# Patient Record
Sex: FEMALE | Race: WHITE | Employment: OTHER | URBAN - METROPOLITAN AREA
[De-identification: names, ages, dates, MRNs, and addresses within clinical notes are randomized per-mention and may not be internally consistent; named-entity substitution may affect disease eponyms.]

---

## 2017-06-20 PROBLEM — R11.2 NAUSEA AND VOMITING: Status: ACTIVE | Noted: 2017-06-20

## 2018-06-21 ENCOUNTER — HOSPITAL ENCOUNTER (OUTPATIENT)
Dept: ULTRASOUND IMAGING | Age: 58
Discharge: HOME | End: 2018-06-21
Payer: COMMERCIAL

## 2018-06-21 DIAGNOSIS — E04.1 RIGHT THYROID NODULE: ICD-10-CM

## 2018-06-21 PROCEDURE — 76536 US EXAM OF HEAD AND NECK: CPT

## 2019-06-02 ENCOUNTER — HOSPITAL ENCOUNTER (OUTPATIENT)
Dept: ULTRASOUND IMAGING | Age: 59
Discharge: HOME | End: 2019-06-02
Payer: COMMERCIAL

## 2019-06-02 DIAGNOSIS — R60.9 SWELLING: ICD-10-CM

## 2019-06-02 DIAGNOSIS — R52 PAIN: ICD-10-CM

## 2019-06-02 PROCEDURE — 93971 EXTREMITY STUDY: CPT

## 2019-07-22 ENCOUNTER — HOSPITAL ENCOUNTER (OUTPATIENT)
Dept: ULTRASOUND IMAGING | Age: 59
Discharge: HOME | End: 2019-07-22
Payer: COMMERCIAL

## 2019-07-22 DIAGNOSIS — E04.1 THYROID NODULE: ICD-10-CM

## 2019-07-22 PROCEDURE — 76536 US EXAM OF HEAD AND NECK: CPT

## 2020-09-25 ENCOUNTER — HOSPITAL ENCOUNTER (OUTPATIENT)
Dept: GENERAL RADIOLOGY | Age: 60
Discharge: HOME | End: 2020-09-27
Payer: COMMERCIAL

## 2020-09-25 ENCOUNTER — HOSPITAL ENCOUNTER (OUTPATIENT)
Age: 60
Discharge: HOME | End: 2020-09-27
Payer: COMMERCIAL

## 2020-09-25 PROCEDURE — 73630 X-RAY EXAM OF FOOT: CPT

## 2021-04-09 ENCOUNTER — OFFICE VISIT (OUTPATIENT)
Dept: ORTHOPEDIC SURGERY | Age: 61
End: 2021-04-09
Payer: COMMERCIAL

## 2021-04-09 VITALS — BODY MASS INDEX: 40.75 KG/M2 | HEIGHT: 63 IN | WEIGHT: 230 LBS | TEMPERATURE: 98 F

## 2021-04-09 DIAGNOSIS — M25.521 RIGHT ELBOW PAIN: Primary | ICD-10-CM

## 2021-04-09 DIAGNOSIS — M77.11 LATERAL EPICONDYLITIS, RIGHT ELBOW: ICD-10-CM

## 2021-04-09 PROCEDURE — G8417 CALC BMI ABV UP PARAM F/U: HCPCS | Performed by: ORTHOPAEDIC SURGERY

## 2021-04-09 PROCEDURE — 3017F COLORECTAL CA SCREEN DOC REV: CPT | Performed by: ORTHOPAEDIC SURGERY

## 2021-04-09 PROCEDURE — 99203 OFFICE O/P NEW LOW 30 MIN: CPT | Performed by: ORTHOPAEDIC SURGERY

## 2021-04-09 PROCEDURE — 1036F TOBACCO NON-USER: CPT | Performed by: ORTHOPAEDIC SURGERY

## 2021-04-09 PROCEDURE — G8427 DOCREV CUR MEDS BY ELIG CLIN: HCPCS | Performed by: ORTHOPAEDIC SURGERY

## 2021-04-09 RX ORDER — TRAZODONE HYDROCHLORIDE 100 MG/1
TABLET ORAL
COMMUNITY
Start: 2021-02-25

## 2021-04-09 RX ORDER — BUSPIRONE HYDROCHLORIDE 30 MG/1
TABLET ORAL
COMMUNITY
Start: 2021-04-01

## 2021-04-09 NOTE — PROGRESS NOTES
Shawnee Devine is a 61 y.o. female, who presents   Chief Complaint   Patient presents with    Elbow Pain     new patient right elbow pain present for about 1 year. No hx of injury. Patient brought films. HPI[de-identified] Right elbow pain is been present reportedly for about a year or more. This is in the lateral aspect of the right elbow with radiation into the forearm and upper arm. This does impair activities some. The patient also has a problem with her back and has spinal stimulator in place. She uses a cane because when the stimulators on she apparently cannot feel her feet well. She went to [email or web address] imaging 3/24/2021 and has 2 views of the right elbow which were reviewed. Allergies; medications; past medical, surgical, family, and social history; and problem list have been reviewed today and updated as indicated in this encounter - see below following Ortho specifics. Musculoskeletal: Skin condition gross neurovascular function good in right upper extremity. Elbow motion is good on the right side with some discomfort. Shoulder and wrist motion and hand motion are good with no pain or instability. There is tenderness to palpation of the lateral elbow most with some in the extensor muscle wad and slightly above this as well. Rotation of the forearm is full with no pain. There is pain with resisted muscle testing involving dorsiflexion of the wrist, radial deviation of the wrist and supination of the forearm. Radiologic Studies: Imaging 3/24/2021 shows a normal right elbow with regards to bony anatomy. ASSESSMENT:  Flavio Mora was seen today for elbow pain.     Diagnoses and all orders for this visit:    Right elbow pain  -     External Referral To Physical Therapy    Lateral epicondylitis, right elbow     Treatment alternatives were reviewed including medical and physical therapies, injections, and surgical options, expected risks benefits and likely outcome of each were discussed in detail, questions asked and answered and understood. We discussed the symptoms as well as physical findings and imaging results. This is typical of tennis elbow or extensor tendinitis and lateral epicondylitis. PLAN: We will schedule physical therapy to try to work this problem out gradually with hopes of a durable result and continued home exercise to prevent recurrence. At some point in her progress, if it seems that an injection would be helpful we will discuss that as well. We will follow-up in about 4 weeks.         Patient Active Problem List   Diagnosis    Arrhythmia    Occipital neuralgia    Liver enzyme elevation    Rheumatoid arthritis ([address]    Obesity    History of ITP    Dyslipidemia    Murmur, heart    Idiopathic thrombocytopenic purpura (HCC)    Hx of gastric bypass    Anemia    Nausea and vomiting       Past Medical History:   Diagnosis Date    Arrhythmia     PVCs on holter    Auto immune neutropenia (HCC)     Hx of blood clots     left thigh many yrs ago    Hyperlipidemia     ITP (idiopathic thrombocytopenic purpura)     Liver disease     history of elevated liver enzymes 2013    Liver enzyme elevation     due to long standing history of migraine drug useage    Migraine     Migraine     Occipital neuralgia     occipital ablation 7/22/2013 [facility]    Osteoporosis     PONV (postoperative nausea and vomiting)     with anesthesia    Thyroid disease        Past Surgical History:   Procedure Laterality Date    BACK SURGERY  2010    removal of titanium plate cervical spine    CHOLECYSTECTOMY      COLONOSCOPY  12/28/2011    bradford Morales CATH LAB PROCEDURE  May 2000    Dr. Noah Elliott : Normal coronaries, EF is 66%    ENDOMETRIAL ABLATION      FRACTURE SURGERY Left      femur    GASTRIC BYPASS SURGERY      JOINT REPLACEMENT  2012    total right knee replacement    JOINT REPLACEMENT  2015    total left knee    OTHER SURGICAL HISTORY      cervical Spinal cord stimulator  in [facility]  2013    left greater occipital radiofreqency ablation    OTHER SURGICAL HISTORY Right 04/06/2017    Excision of cyst on ring finger    SPINAL FUSION      spinal fusion of C5 and C6 then hardware was removed    SPLENECTOMY      d/t ITP    WRIST FRACTURE SURGERY      multiple 7 right   1 on left       Current Outpatient Medications   Medication Sig Dispense Refill    busPIRone (BUSPAR) 30 MG tablet       traZODone (DESYREL) 100 MG tablet TAKE 1/2 TO 2 TABLETS BY MOUTH AT BEDTIME AS NEEDED FOR SLEEP      levothyroxine (SYNTHROID) 75 MCG tablet Take 75 mcg by mouth Daily      Teriparatide, Recombinant, (FORTEO SC) Inject 20 mcg into the skin      simvastatin (ZOCOR) 20 MG tablet Take 20 mg by mouth nightly.  duloxetine (CYMBALTA) 60 MG capsule Take 60 mg by mouth daily       Multiple Vitamin (MULTI-VITAMIN PO) Take 1 tablet by mouth daily. No current facility-administered medications for this visit. Allergies   Allergen Reactions    Sulfites Hives, Shortness Of Breath and Itching     flush    Aspirin Other (See Comments)    Ceclor [Cefaclor] Itching and Swelling     Of face/throat    Kiwi Extract     Tape Bernell Colla Tape] Other (See Comments)     SKIN IRRITATION    Topamax Other (See Comments)     JAW AND CHEST PAIN    Other Itching and Rash     blisters  Apples/kiwi  Itching in throat. Toothpaste environmental       Social History     Socioeconomic History    Marital status:      Spouse name: None    Number of children: None    Years of education: None    Highest education level: None   Occupational History    None   Social Needs    Financial resource strain: None    Food insecurity     Worry: None     Inability: None    Transportation needs     Medical: None     Non-medical: None   Tobacco Use    Smoking status: Never Smoker    Smokeless tobacco: Never Used   Substance and Sexual Activity    Alcohol use:  Yes

## 2021-05-07 ENCOUNTER — OFFICE VISIT (OUTPATIENT)
Dept: ORTHOPEDIC SURGERY | Age: 61
End: 2021-05-07
Payer: COMMERCIAL

## 2021-05-07 VITALS — HEIGHT: 63 IN | WEIGHT: 230 LBS | BODY MASS INDEX: 40.75 KG/M2

## 2021-05-07 DIAGNOSIS — M77.11 LATERAL EPICONDYLITIS, RIGHT ELBOW: Primary | ICD-10-CM

## 2021-05-07 PROCEDURE — 99213 OFFICE O/P EST LOW 20 MIN: CPT | Performed by: ORTHOPAEDIC SURGERY

## 2021-05-07 PROCEDURE — G8417 CALC BMI ABV UP PARAM F/U: HCPCS | Performed by: ORTHOPAEDIC SURGERY

## 2021-05-07 PROCEDURE — G8427 DOCREV CUR MEDS BY ELIG CLIN: HCPCS | Performed by: ORTHOPAEDIC SURGERY

## 2021-05-07 PROCEDURE — 20551 NJX 1 TENDON ORIGIN/INSJ: CPT | Performed by: ORTHOPAEDIC SURGERY

## 2021-05-07 PROCEDURE — 1036F TOBACCO NON-USER: CPT | Performed by: ORTHOPAEDIC SURGERY

## 2021-05-07 PROCEDURE — 3017F COLORECTAL CA SCREEN DOC REV: CPT | Performed by: ORTHOPAEDIC SURGERY

## 2021-05-07 NOTE — PROGRESS NOTES
Chief Complaint:   Chief Complaint   Patient presents with    Elbow Pain     Right elbow follow up. Since last visit her elbow pain has improved to a degree with PT.       Mark Anthony Bullock follows up for right lateral epicondylitis. She is doing better overall but still has some discomfort particular if she is doing's little tasks like putting on her electric toothbrush with her thumb. Has been wearing a strap she is been going to physical therapy and her notes were faxed to us for today. Allergies; medications; past medical, surgical, family, and social history; and problem list have been reviewed today and updated as indicated in this encounter seen below. Exam: Skin condition gross neurovascular function good in right upper extremity. Right elbow motion is good. She has full forearm rotation. She has good  and pinch. There is less discomfort with dorsiflexion of the wrist against resistance. There remains tenderness to palpation in the extensor muscles near the epicondyles laterally. Radiographs: None    ASSESSMENT:    Rosa Cutler was seen today for elbow pain. Diagnoses and all orders for this visit:    Lateral epicondylitis, right elbow        PLAN: After further discussion Mrs. Cassandra Cam would like an injection to help decrease her discomfort and help progress her physical therapy and recovery. We discussed the risks and potential benefits of this. injection of the tendon the lateral epicondyle right with Kenalog   (triamcinalone)         10mg and 1/2 cc 1/4 % bupivicainewas discussed with the patient. Discussion included but was not limited to potential risks and benefits. No contraindications to the procedure were noted. Understanding and agreement was indicated. The procedure was accomplished without incident using appropriate aseptic technique. follow up as scheduled    Return in about 4 weeks (around 6/4/2021).        Current Outpatient Medications   Medication Sig Dispense Refill    busPIRone (BUSPAR) 30 MG tablet       traZODone (DESYREL) 100 MG tablet TAKE 1/2 TO 2 TABLETS BY MOUTH AT BEDTIME AS NEEDED FOR SLEEP      levothyroxine (SYNTHROID) 75 MCG tablet Take 75 mcg by mouth Daily      Teriparatide, Recombinant, (FORTEO SC) Inject 20 mcg into the skin      simvastatin (ZOCOR) 20 MG tablet Take 20 mg by mouth nightly.  duloxetine (CYMBALTA) 60 MG capsule Take 60 mg by mouth daily       Multiple Vitamin (MULTI-VITAMIN PO) Take 1 tablet by mouth daily. No current facility-administered medications for this visit.         Patient Active Problem List   Diagnosis    Arrhythmia    Occipital neuralgia    Liver enzyme elevation    Rheumatoid arthritis ([facility]    Obesity    History of ITP    Dyslipidemia    Murmur, heart    Idiopathic thrombocytopenic purpura (HCC)    Hx of gastric bypass    Anemia    Nausea and vomiting       Past Medical History:   Diagnosis Date    Arrhythmia     PVCs on holter    Auto immune neutropenia (HCC)     Hx of blood clots     left thigh many yrs ago    Hyperlipidemia     ITP (idiopathic thrombocytopenic purpura)     Liver disease     history of elevated liver enzymes 2013    Liver enzyme elevation     due to long standing history of migraine drug useage    Migraine     Migraine     Occipital neuralgia     occipital ablation 7/22/2013 [facility]    Osteoporosis     PONV (postoperative nausea and vomiting)     with anesthesia    Thyroid disease        Past Surgical History:   Procedure Laterality Date    BACK SURGERY  2010    removal of titanium plate cervical spine    CHOLECYSTECTOMY      COLONOSCOPY  12/28/2011    bradford Martinez CATH LAB PROCEDURE  May 2000    Dr. Montse Garduno : Normal coronaries, EF is 66%    ENDOMETRIAL ABLATION      FRACTURE SURGERY Left      femur    GASTRIC BYPASS SURGERY      JOINT REPLACEMENT  2012    total right knee replacement    JOINT REPLACEMENT  2015    total left knee    OTHER SURGICAL HISTORY      cervical Spinal cord stimulator  in [place]   Dionne Rogers OTHER SURGICAL HISTORY  2013    left greater occipital radiofreqency ablation    OTHER SURGICAL HISTORY Right 04/06/2017    Excision of cyst on ring finger    SPINAL FUSION      spinal fusion of C5 and C6 then hardware was removed    SPLENECTOMY      d/t ITP    WRIST FRACTURE SURGERY      multiple 7 right   1 on left       Allergies   Allergen Reactions    Sulfites Hives, Shortness Of Breath and Itching     flush    Aspirin Other (See Comments)    Ceclor [Cefaclor] Itching and Swelling     Of face/throat    Kiwi Extract     Tape Aden Footman Tape] Other (See Comments)     SKIN IRRITATION    Topamax Other (See Comments)     JAW AND CHEST PAIN    Other Itching and Rash     blisters  Apples/kiwi  Itching in throat.  Toothpaste environmental       Social History     Socioeconomic History    Marital status:      Spouse name: None    Number of children: None    Years of education: None    Highest education level: None   Occupational History    None   Social Needs    Financial resource strain: None    Food insecurity     Worry: None     Inability: None    Transportation needs     Medical: None     Non-medical: None   Tobacco Use    Smoking status: Never Smoker    Smokeless tobacco: Never Used   Substance and Sexual Activity    Alcohol use: Yes     Comment: socially    Drug use: No    Sexual activity: None   Lifestyle    Physical activity     Days per week: None     Minutes per session: None    Stress: None   Relationships    Social connections     Talks on phone: None     Gets together: None     Attends Scientology service: None     Active member of club or organization: None     Attends meetings of clubs or organizations: None     Relationship status: None    Intimate partner violence     Fear of current or ex partner: None     Emotionally abused: None     Physically abused: None     Forced sexual activity: None Other Topics Concern    None   Social History Narrative    None       Review of Systems  As follows except as previously noted in HPI:  Constitutional: Negative for chills, diaphoresis, fatigue, fever and unexpected weight change. Respiratory: Negative for cough, shortness of breath and wheezing. Cardiovascular: Negative for chest pain and palpitations. Neurological: Negative for dizziness, syncope, cephalgia. GI / : negative  Musculoskeletal: see HPI       Objective:   Physical Exam   Constitutional: Oriented to person, place, and time. and appears well-developed and well-nourished. :   Head: Normocephalic and atraumatic. Eyes: EOM are normal.   Neck: Neck supple. Cardiovascular: Normal rate and regular rhythm. Pulmonary/Chest: Effort normal. No stridor. No respiratory distress, no wheezes. Abdominal:  No abnormal distension. Neurological: Alert and oriented to person, place, and time. Skin: Skin is warm and dry. Psychiatric: Normal mood and affect.  Behavior is normal. Thought content normal.    TISHA Maxwell DO    5/7/21  10:06 AM

## 2021-06-04 ENCOUNTER — OFFICE VISIT (OUTPATIENT)
Dept: ORTHOPEDIC SURGERY | Age: 61
End: 2021-06-04
Payer: COMMERCIAL

## 2021-06-04 VITALS — WEIGHT: 230 LBS | HEIGHT: 63 IN | BODY MASS INDEX: 40.75 KG/M2

## 2021-06-04 DIAGNOSIS — M77.11 LATERAL EPICONDYLITIS, RIGHT ELBOW: Primary | ICD-10-CM

## 2021-06-04 PROCEDURE — G8427 DOCREV CUR MEDS BY ELIG CLIN: HCPCS | Performed by: ORTHOPAEDIC SURGERY

## 2021-06-04 PROCEDURE — 3017F COLORECTAL CA SCREEN DOC REV: CPT | Performed by: ORTHOPAEDIC SURGERY

## 2021-06-04 PROCEDURE — G8417 CALC BMI ABV UP PARAM F/U: HCPCS | Performed by: ORTHOPAEDIC SURGERY

## 2021-06-04 PROCEDURE — 99213 OFFICE O/P EST LOW 20 MIN: CPT | Performed by: ORTHOPAEDIC SURGERY

## 2021-06-04 PROCEDURE — 1036F TOBACCO NON-USER: CPT | Performed by: ORTHOPAEDIC SURGERY

## 2021-06-04 NOTE — PROGRESS NOTES
Chief Complaint:   Chief Complaint   Patient presents with    Elbow Pain     Right elbow follow up. Patient did see good relief after injection at last visit one month ago. Himanshu Valverde follows up for right upper extremity problems. She has lateral epicondylitis. She is finished her physical therapy and is continued to do her exercises. She also had the injection here last visit which did help her. She states overall she is doing quite well. Allergies; medications; past medical, surgical, family, and social history; and problem list have been reviewed today and updated as indicated in this encounter seen below. Exam: Range of motion of the right elbow is good. Right wrist range of motion is good. There is some crepitus with rotation of the forearm to patient said that she is fractured her right distal forearm 7 times. There is no tenderness palpation in the mobile wad and the proximal forearm and no epicondylar area pain at this time. There is no swelling or redness. Radiographs: None    ASSESSMENT:    Soni Duran was seen today for elbow pain. Diagnoses and all orders for this visit:    Lateral epicondylitis, right elbow        PLAN: Emphasis was placed on continuing the exercises shown in physical therapy to maintain benefit and event recurrences. We will follow-up on an as-needed basis. Return if symptoms worsen or fail to improve. Current Outpatient Medications   Medication Sig Dispense Refill    busPIRone (BUSPAR) 30 MG tablet       traZODone (DESYREL) 100 MG tablet TAKE 1/2 TO 2 TABLETS BY MOUTH AT BEDTIME AS NEEDED FOR SLEEP      levothyroxine (SYNTHROID) 75 MCG tablet Take 75 mcg by mouth Daily      Teriparatide, Recombinant, (FORTEO SC) Inject 20 mcg into the skin      simvastatin (ZOCOR) 20 MG tablet Take 20 mg by mouth nightly.       duloxetine (CYMBALTA) 60 MG capsule Take 60 mg by mouth daily       Multiple Vitamin (MULTI-VITAMIN PO) Take 1 tablet by mouth daily. No current facility-administered medications for this visit.        Patient Active Problem List   Diagnosis    Arrhythmia    Occipital neuralgia    Liver enzyme elevation    [address]oid arthritis ([facility] [facility]    Obesity    History of ITP    Dyslipidemia    Murmur, heart    Idiopathic thrombocytopenic purpura (HCC)    Hx of gastric bypass    Anemia    Nausea and vomiting       Past Medical History:   Diagnosis Date    Arrhythmia     PVCs on holter    Auto immune neutropenia (HCC)     Hx of blood clots     left thigh many yrs ago    Hyperlipidemia     ITP (idiopathic thrombocytopenic purpura)     Liver disease     history of elevated liver enzymes 2013    Liver enzyme elevation     due to long standing history of migraine drug useage    Migraine     Migraine     Occipital neuralgia     occipital ablation 7/22/2013 [facility]    Osteoporosis     PONV (postoperative nausea and vomiting)     with anesthesia    Thyroid disease        Past Surgical History:   Procedure Laterality Date    BACK SURGERY  2010    removal of titanium plate cervical spine    CHOLECYSTECTOMY      COLONOSCOPY  12/28/2011    neg   [employer] CATH LAB PROCEDURE  May 2000    Dr. Osman De Jesus : Normal coronaries, EF is 66%    ENDOMETRIAL ABLATION      FRACTURE SURGERY Left      femur    GASTRIC BYPASS SURGERY      JOINT REPLACEMENT  2012    total right knee replacement    JOINT REPLACEMENT  2015    total left knee    OTHER SURGICAL HISTORY      cervical Spinal cord stimulator  in [address]  2013    left greater occipital radiofreqency ablation    OTHER SURGICAL HISTORY Right 04/06/2017    Excision of cyst on ring finger    SPINAL FUSION      spinal fusion of C5 and C6 then hardware was removed    SPLENECTOMY      d/t ITP    WRIST FRACTURE SURGERY      multiple 7 right   1 on left       Allergies   Allergen Reactions    Sulfites Hives, Shortness Of Breath and Itching flush    Aspirin Other (See Comments)    Ceclor [Cefaclor] Itching and Swelling     Of face/throat    Kiwi Extract     Tape Lajune Gitelman Tape] Other (See Comments)     SKIN IRRITATION    Topamax Other (See Comments)     JAW AND CHEST PAIN    Other Itching and Rash     blisters  Apples/kiwi  Itching in throat. Toothpaste environmental       Social History     Socioeconomic History    Marital status:      Spouse name: None    Number of children: None    Years of education: None    Highest education level: None   Occupational History    None   Tobacco Use    Smoking status: Never Smoker    Smokeless tobacco: Never Used   Substance and Sexual Activity    Alcohol use: Yes     Comment: socially    Drug use: No    Sexual activity: None   Other Topics Concern    None   Social History Narrative    None     Social Determinants of Health     Financial Resource Strain:     Difficulty of Paying Living Expenses:    Food Insecurity:     Worried About Running Out of Food in the Last Year:     Ran Out of Food in the Last Year:    Transportation Needs:     Lack of Transportation (Medical):  Lack of Transportation (Non-Medical):    Physical Activity:     Days of Exercise per Week:     Minutes of Exercise per Session:    Stress:     Feeling of Stress :    Social Connections:     Frequency of Communication with Friends and Family:     Frequency of Social Gatherings with Friends and Family:     Attends Bahai Services:     Active Member of Clubs or Organizations:     Attends Club or Organization Meetings:     Marital Status:    Intimate Partner Violence:     Fear of Current or Ex-Partner:     Emotionally Abused:     Physically Abused:     Sexually Abused:        Review of Systems  As follows except as previously noted in HPI:  Constitutional: Negative for chills, diaphoresis, fatigue, fever and unexpected weight change. Respiratory: Negative for cough, shortness of breath and wheezing. Cardiovascular: Negative for chest pain and palpitations. Neurological: Negative for dizziness, syncope, cephalgia. GI / : negative  Musculoskeletal: see HPI       Objective:   Physical Exam   Constitutional: Oriented to person, place, and time. and appears well-developed and well-nourished. :   Head: Normocephalic and atraumatic. Eyes: EOM are normal.   Neck: Neck supple. Cardiovascular: Normal rate and regular rhythm. Pulmonary/Chest: Effort normal. No stridor. No respiratory distress, no wheezes. Abdominal:  No abnormal distension. Neurological: Alert and oriented to person, place, and time. Skin: Skin is warm and dry. Psychiatric: Normal mood and affect.  Behavior is normal. Thought content normal.    TISHA Gay, DO    6/4/21  8:30 AM

## 2021-11-12 ENCOUNTER — OFFICE VISIT (OUTPATIENT)
Dept: ORTHOPEDIC SURGERY | Age: 61
End: 2021-11-12
Payer: COMMERCIAL

## 2021-11-12 VITALS — WEIGHT: 230 LBS | BODY MASS INDEX: 40.75 KG/M2 | TEMPERATURE: 98 F | HEIGHT: 63 IN

## 2021-11-12 DIAGNOSIS — G89.29 CHRONIC LEFT SHOULDER PAIN: ICD-10-CM

## 2021-11-12 DIAGNOSIS — S46.912A STRAIN OF LEFT SHOULDER, INITIAL ENCOUNTER: Primary | ICD-10-CM

## 2021-11-12 DIAGNOSIS — M25.512 CHRONIC LEFT SHOULDER PAIN: ICD-10-CM

## 2021-11-12 PROCEDURE — G8484 FLU IMMUNIZE NO ADMIN: HCPCS | Performed by: ORTHOPAEDIC SURGERY

## 2021-11-12 PROCEDURE — G8417 CALC BMI ABV UP PARAM F/U: HCPCS | Performed by: ORTHOPAEDIC SURGERY

## 2021-11-12 PROCEDURE — 3017F COLORECTAL CA SCREEN DOC REV: CPT | Performed by: ORTHOPAEDIC SURGERY

## 2021-11-12 PROCEDURE — 1036F TOBACCO NON-USER: CPT | Performed by: ORTHOPAEDIC SURGERY

## 2021-11-12 PROCEDURE — 99213 OFFICE O/P EST LOW 20 MIN: CPT | Performed by: ORTHOPAEDIC SURGERY

## 2021-11-12 PROCEDURE — G8427 DOCREV CUR MEDS BY ELIG CLIN: HCPCS | Performed by: ORTHOPAEDIC SURGERY

## 2021-11-12 RX ORDER — DULOXETIN HYDROCHLORIDE 30 MG/1
CAPSULE, DELAYED RELEASE ORAL
COMMUNITY
Start: 2021-11-01

## 2021-11-12 RX ORDER — BUPROPION HYDROCHLORIDE 100 MG/1
100 TABLET ORAL 2 TIMES DAILY
COMMUNITY

## 2021-11-12 NOTE — PROGRESS NOTES
Juan Haskins is a 64 y.o. female, who presents   Chief Complaint   Patient presents with    Shoulder Pain     est patient new problem. Left shoulder injury in July. Picked up a heavy box and \"felt something tear\"       HPI[de-identified] Left shoulder pain onset was about June 2021. This is Sammie Gann was lifting boxes apparently clearing out her father's house and experienced pain in the front the left shoulder which has persisted with radiation about the area. He doesn't describe sherrell weakness but has pain with lifting particularly with her arms out and with some rotational movements as well. She has no feelings of instability. Treatment has been with topical treatment as well as Tylenol. This has not been real helpful for her. Allergies; medications; past medical, surgical, family, and social history; and problem list have been reviewed today and updated as indicated in this encounter - see below following Ortho specifics. Musculoskeletal: Skin condition gross neurovascular function is good in left upper extremity. Elbow wrist and hand motion are good without instability or pain. The left shoulder has some tenderness in general around the acromioclavicular arch area. There is no suggestion of long head biceps tendon rupture. On muscle testing she seems to have no obvious rotator cuff disruption. The range of motion overall is very good with some crepitus in the subacromial area. Radiologic Studies: Imaging of the left shoulder shows normal bony anatomy with good location of the humeral head and the glenoid and good subacromial space. The [facility] joint looks good as well. There are no other bony abnormalities noted. She does have evidence of the leads for her spinal stimulator. ASSESSMENT:  Daisha Parrish was seen today for shoulder pain. Diagnoses and all orders for this visit:    Strain of left shoulder, initial encounter    Chronic left shoulder pain  -     XR SHOULDER LEFT (MIN 2 VIEWS);  Future  - External Referral To Physical Therapy     Treatment alternatives were reviewed including medical and physical therapies, injections, and surgical options, expected risks benefits and likely outcome of each were discussed in detail, questions asked and answered and understood. We discussed symptoms as well as physical findings and imaging results. This suggests muscle strain in the left shoulder area. There may be some impingement with no obvious bony contributors. We discussed treatment options as well and we'll start out with conservative treatment. PLAN: We'll schedule physical therapy and follow-up in about 4 weeks.         Patient Active Problem List   Diagnosis    Arrhythmia    Occipital neuralgia    Liver enzyme elevation    Rheumatoid arthritis ([facility] [address]    Obesity    History of ITP    Dyslipidemia    Murmur, heart    Idiopathic thrombocytopenic purpura (HCC)    Hx of gastric bypass    Anemia    Nausea and vomiting       Past Medical History:   Diagnosis Date    Arrhythmia     PVCs on holter    Auto immune neutropenia (HCC)     Hx of blood clots     left thigh many yrs ago    Hyperlipidemia     ITP (idiopathic thrombocytopenic purpura)     Liver disease     history of elevated liver enzymes 2013    Liver enzyme elevation     due to long standing history of migraine drug useage    Migraine     Migraine     Occipital neuralgia     occipital ablation 7/22/2013 [facility]    Osteoporosis     PONV (postoperative nausea and vomiting)     with anesthesia    Thyroid disease        Past Surgical History:   Procedure Laterality Date    BACK SURGERY  2010    removal of titanium plate cervical spine    CHOLECYSTECTOMY      COLONOSCOPY  12/28/2011    [facility] CATH LAB PROCEDURE  May 2000    Dr. Javed Marcial : Normal coronaries, EF is 66%    ENDOMETRIAL ABLATION      FRACTURE SURGERY Left      femur    GASTRIC BYPASS SURGERY      JOINT REPLACEMENT  2012    total right knee replacement    JOINT REPLACEMENT  2015    total left knee    OTHER SURGICAL HISTORY      cervical Spinal cord stimulator  in [facility]  2013    left greater occipital radiofreqency ablation    OTHER SURGICAL HISTORY Right 04/06/2017    Excision of cyst on ring finger    SPINAL FUSION      spinal fusion of C5 and C6 then hardware was removed    SPLENECTOMY      d/t ITP    WRIST FRACTURE SURGERY      multiple 7 right   1 on left       Current Outpatient Medications   Medication Sig Dispense Refill    buPROPion (WELLBUTRIN) 100 MG tablet Take 100 mg by mouth 2 times daily      busPIRone (BUSPAR) 30 MG tablet       traZODone (DESYREL) 100 MG tablet TAKE 1/2 TO 2 TABLETS BY MOUTH AT BEDTIME AS NEEDED FOR SLEEP      levothyroxine (SYNTHROID) 75 MCG tablet Take 75 mcg by mouth Daily      Teriparatide, Recombinant, (FORTEO SC) Inject 20 mcg into the skin      simvastatin (ZOCOR) 20 MG tablet Take 20 mg by mouth nightly.  duloxetine (CYMBALTA) 60 MG capsule Take 60 mg by mouth daily       Multiple Vitamin (MULTI-VITAMIN PO) Take 1 tablet by mouth daily.  DULoxetine (CYMBALTA) 30 MG extended release capsule TAKE ONE CAPSULE BY MOUTH ONCE DAILY IN THE AFTERNOON       No current facility-administered medications for this visit. Allergies   Allergen Reactions    Sulfites Hives, Shortness Of Breath and Itching     flush    Aspirin Other (See Comments)    Ceclor [Cefaclor] Itching and Swelling     Of face/throat    Kiwi Extract     Tape Satnam Evgeny Tape] Other (See Comments)     SKIN IRRITATION    Topamax Other (See Comments)     JAW AND CHEST PAIN    Other Itching and Rash     blisters  Apples/kiwi  Itching in throat.  Toothpaste environmental       Social History     Socioeconomic History    Marital status:      Spouse name: None    Number of children: None    Years of education: None    Highest education level: None   Occupational History    None Tobacco Use    Smoking status: Never Smoker    Smokeless tobacco: Never Used   Substance and Sexual Activity    Alcohol use: Yes     Comment: socially    Drug use: No    Sexual activity: None   Other Topics Concern    None   Social History Narrative    None     Social Determinants of Health     Financial Resource Strain:     Difficulty of Paying Living Expenses: Not on file   Food Insecurity:     Worried About Running Out of Food in the Last Year: Not on file    Cally of Food in the Last Year: Not on file   Transportation Needs:     Lack of Transportation (Medical): Not on file    Lack of Transportation (Non-Medical): Not on file   Physical Activity:     Days of Exercise per Week: Not on file    Minutes of Exercise per Session: Not on file   Stress:     Feeling of Stress : Not on file   Social Connections:     Frequency of Communication with Friends and Family: Not on file    Frequency of Social Gatherings with Friends and Family: Not on file    Attends Alevism Services: Not on file    Active Member of [address] [place] [address] or Organizations: Not on file    Attends Club or Organization Meetings: Not on file    Marital Status: Not on file   Intimate Partner Violence:     Fear of Current or Ex-Partner: Not on file    Emotionally Abused: Not on file    Physically Abused: Not on file    Sexually Abused: Not on file   Housing Stability:     Unable to Pay for Housing in the Last Year: Not on file    Number of [place] in the Last Year: Not on file    Unstable Housing in the Last Year: Not on file       Family History   Problem Relation Age of Onset    High Blood Pressure Father     Arthritis Brother          Review of Systems:   As follows except as previously noted in HPI:  Constitutional: Negative for chills, diaphoresis,  fever   Respiratory: Negative for cough, shortness of breath and wheezing. Cardiovascular: Negative for chest pain and palpitations.    Neurological: Negative for dizziness, syncope,   GI / : abdominal pain or cramping  Musculoskeletal: see HPI       Objective:   Physical Exam   Constitutional: Oriented to person, place, and time. and appears well-developed and well-nourished. :   Head: Normocephalic and atraumatic. Neck: Neck supple. Eyes: EOM are normal.   Pulmonary/Chest: Effort normal.  No respiratory distress, no wheezes. Neurological: Alert and oriented to person  Skin: Skin is warm and dry. Lanette Gaming DO    11/12/21  9:43 AM    All reasonable efforts have been made to minimize the risk of errors that may occur in the use of voice recognition and other electronic means of charting.

## 2021-12-01 ENCOUNTER — TELEPHONE (OUTPATIENT)
Dept: ORTHOPEDIC SURGERY | Age: 61
End: 2021-12-01

## 2021-12-15 ENCOUNTER — OFFICE VISIT (OUTPATIENT)
Dept: ORTHOPEDIC SURGERY | Age: 61
End: 2021-12-15
Payer: COMMERCIAL

## 2021-12-15 VITALS — TEMPERATURE: 98 F | WEIGHT: 230 LBS | HEIGHT: 63 IN | BODY MASS INDEX: 40.75 KG/M2

## 2021-12-15 DIAGNOSIS — M75.52 BURSITIS OF LEFT SHOULDER: Primary | ICD-10-CM

## 2021-12-15 PROCEDURE — 1036F TOBACCO NON-USER: CPT | Performed by: ORTHOPAEDIC SURGERY

## 2021-12-15 PROCEDURE — G8484 FLU IMMUNIZE NO ADMIN: HCPCS | Performed by: ORTHOPAEDIC SURGERY

## 2021-12-15 PROCEDURE — 20610 DRAIN/INJ JOINT/BURSA W/O US: CPT | Performed by: ORTHOPAEDIC SURGERY

## 2021-12-15 PROCEDURE — 99213 OFFICE O/P EST LOW 20 MIN: CPT | Performed by: ORTHOPAEDIC SURGERY

## 2021-12-15 PROCEDURE — G8427 DOCREV CUR MEDS BY ELIG CLIN: HCPCS | Performed by: ORTHOPAEDIC SURGERY

## 2021-12-15 PROCEDURE — 3017F COLORECTAL CA SCREEN DOC REV: CPT | Performed by: ORTHOPAEDIC SURGERY

## 2021-12-15 PROCEDURE — G8417 CALC BMI ABV UP PARAM F/U: HCPCS | Performed by: ORTHOPAEDIC SURGERY

## 2021-12-15 RX ORDER — TRIAMCINOLONE ACETONIDE 40 MG/ML
40 INJECTION, SUSPENSION INTRA-ARTICULAR; INTRAMUSCULAR ONCE
Status: COMPLETED | OUTPATIENT
Start: 2021-12-15 | End: 2021-12-15

## 2021-12-15 RX ADMIN — TRIAMCINOLONE ACETONIDE 40 MG: 40 INJECTION, SUSPENSION INTRA-ARTICULAR; INTRAMUSCULAR at 09:40

## 2021-12-15 NOTE — PROGRESS NOTES
was accomplished without incident using appropriate aseptic technique. follow up as needed    Return Patient will call and report symptoms after physical therapy finishes. Current Outpatient Medications   Medication Sig Dispense Refill    DULoxetine (CYMBALTA) 30 MG extended release capsule TAKE ONE CAPSULE BY MOUTH ONCE DAILY IN THE AFTERNOON      buPROPion (WELLBUTRIN) 100 MG tablet Take 100 mg by mouth 2 times daily      busPIRone (BUSPAR) 30 MG tablet       traZODone (DESYREL) 100 MG tablet TAKE 1/2 TO 2 TABLETS BY MOUTH AT BEDTIME AS NEEDED FOR SLEEP      levothyroxine (SYNTHROID) 75 MCG tablet Take 75 mcg by mouth Daily      Teriparatide, Recombinant, (FORTEO SC) Inject 20 mcg into the skin      simvastatin (ZOCOR) 20 MG tablet Take 20 mg by mouth nightly.  duloxetine (CYMBALTA) 60 MG capsule Take 60 mg by mouth daily       Multiple Vitamin (MULTI-VITAMIN PO) Take 1 tablet by mouth daily. No current facility-administered medications for this visit.        Patient Active Problem List   Diagnosis    Arrhythmia    Occipital neuralgia    Liver enzyme elevation    [facility]oid arthritis ([facility] [address]    Obesity    History of ITP    Dyslipidemia    Murmur, heart    Idiopathic thrombocytopenic purpura (HCC)    Hx of gastric bypass    Anemia    Nausea and vomiting       Past Medical History:   Diagnosis Date    Arrhythmia     PVCs on holter    Auto immune neutropenia (HCC)     Hx of blood clots     left thigh many yrs ago    Hyperlipidemia     ITP (idiopathic thrombocytopenic purpura)     Liver disease     history of elevated liver enzymes 2013    Liver enzyme elevation     due to long standing history of migraine drug useage    Migraine     Migraine     Occipital neuralgia     occipital ablation 7/22/2013 [facility]    Osteoporosis     PONV (postoperative nausea and vomiting)     with anesthesia    Thyroid disease        Past Surgical History:   Procedure Laterality Date file    [address] in the Last Year: Not on file   Transportation Needs:     Lack of Transportation (Medical): Not on file    Lack of Transportation (Non-Medical): Not on file   Physical Activity:     Days of Exercise per Week: Not on file    Minutes of Exercise per Session: Not on file   Stress:     Feeling of Stress : Not on file   Social Connections:     Frequency of Communication with Friends and Family: Not on file    Frequency of Social Gatherings with Friends and Family: Not on file    Attends Jewish Services: Not on file    Active Member of [address] [place] [address] [employer] or Organizations: Not on file    Attends Club or Organization Meetings: Not on file    Marital Status: Not on file   Intimate Partner Violence:     Fear of Current or Ex-Partner: Not on file    Emotionally Abused: Not on file    Physically Abused: Not on file    Sexually Abused: Not on file   Housing Stability:     Unable to Pay for Housing in the Last Year: Not on file    Number of [place] in the Last Year: Not on file    Unstable Housing in the Last Year: Not on file       Review of Systems  As follows except as previously noted in HPI:  Constitutional: Negative for chills, diaphoresis, fatigue, fever and unexpected weight change. Respiratory: Negative for cough, shortness of breath and wheezing. Cardiovascular: Negative for chest pain and palpitations. Neurological: Negative for dizziness, syncope, cephalgia. GI / : negative  Musculoskeletal: see HPI       Objective:   Physical Exam   Constitutional: Oriented to person, place, and time. and appears well-developed and well-nourished. :   Head: Normocephalic and atraumatic. Eyes: EOM are normal.   Neck: Neck supple. Cardiovascular: Normal rate and regular rhythm. Pulmonary/Chest: Effort normal. No stridor. No respiratory distress, no wheezes. Abdominal:  No abnormal distension. Neurological: Alert and oriented to person, place, and time.    Skin: Skin is warm and dry.   Psychiatric: Normal mood and affect.  Behavior is normal. Thought content normal.    TISHA Funes,     12/15/21  9:41 AM

## 2022-05-27 ENCOUNTER — HOSPITAL ENCOUNTER (OUTPATIENT)
Dept: GENERAL RADIOLOGY | Age: 62
Discharge: HOME | End: 2022-05-29
Payer: COMMERCIAL

## 2022-05-27 ENCOUNTER — HOSPITAL ENCOUNTER (OUTPATIENT)
Age: 62
Discharge: HOME | End: 2022-05-29
Payer: COMMERCIAL

## 2022-05-27 ENCOUNTER — HOSPITAL ENCOUNTER (OUTPATIENT)
Age: 62
Discharge: HOME | End: 2022-05-27
Payer: COMMERCIAL

## 2022-05-27 DIAGNOSIS — M25.542 ARTHRALGIA OF HAND, LEFT: ICD-10-CM

## 2022-05-27 DIAGNOSIS — M25.532 LEFT WRIST PAIN: ICD-10-CM

## 2022-05-27 LAB
BASOPHILS ABSOLUTE: 0.09 E9/L (ref 0–0.2)
BASOPHILS RELATIVE PERCENT: 0.7 % (ref 0–2)
C-REACTIVE PROTEIN: 0.8 MG/DL (ref 0–0.4)
EOSINOPHILS ABSOLUTE: 0.46 E9/L (ref 0.05–0.5)
EOSINOPHILS RELATIVE PERCENT: 3.6 % (ref 0–6)
HCT VFR BLD CALC: 38.3 % (ref 34–48)
HEMOGLOBIN: 12.6 G/DL (ref 11.5–15.5)
IMMATURE GRANULOCYTES #: 0.04 E9/L
IMMATURE GRANULOCYTES %: 0.3 % (ref 0–5)
LYMPHOCYTES ABSOLUTE: 5.49 E9/L (ref 1.5–4)
LYMPHOCYTES RELATIVE PERCENT: 43.1 % (ref 20–42)
MCH RBC QN AUTO: 31.9 PG (ref 26–35)
MCHC RBC AUTO-ENTMCNC: 32.9 % (ref 32–34.5)
MCV RBC AUTO: 97 FL (ref 80–99.9)
MONOCYTES ABSOLUTE: 0.85 E9/L (ref 0.1–0.95)
MONOCYTES RELATIVE PERCENT: 6.7 % (ref 2–12)
NEUTROPHILS ABSOLUTE: 5.81 E9/L (ref 1.8–7.3)
NEUTROPHILS RELATIVE PERCENT: 45.6 % (ref 43–80)
PDW BLD-RTO: 15.1 FL (ref 11.5–15)
PLATELET # BLD: 353 E9/L (ref 130–450)
PMV BLD AUTO: 10.6 FL (ref 7–12)
RBC # BLD: 3.95 E12/L (ref 3.5–5.5)
RHEUMATOID FACTOR: <10 IU/ML (ref 0–13)
SEDIMENTATION RATE, ERYTHROCYTE: 40 MM/HR (ref 0–20)
URIC ACID, SERUM: 3.5 MG/DL (ref 2.4–5.7)
VITAMIN D 25-HYDROXY: 52 NG/ML (ref 30–100)
WBC # BLD: 12.7 E9/L (ref 4.5–11.5)

## 2022-05-27 PROCEDURE — 82306 VITAMIN D 25 HYDROXY: CPT

## 2022-05-27 PROCEDURE — 86200 CCP ANTIBODY: CPT

## 2022-05-27 PROCEDURE — 85651 RBC SED RATE NONAUTOMATED: CPT

## 2022-05-27 PROCEDURE — 84550 ASSAY OF BLOOD/URIC ACID: CPT

## 2022-05-27 PROCEDURE — 73130 X-RAY EXAM OF HAND: CPT

## 2022-05-27 PROCEDURE — 85025 COMPLETE CBC W/AUTO DIFF WBC: CPT

## 2022-05-27 PROCEDURE — 86431 RHEUMATOID FACTOR QUANT: CPT

## 2022-05-27 PROCEDURE — 86140 C-REACTIVE PROTEIN: CPT

## 2022-05-27 PROCEDURE — 73110 X-RAY EXAM OF WRIST: CPT

## 2022-05-27 PROCEDURE — 36415 COLL VENOUS BLD VENIPUNCTURE: CPT

## 2022-06-02 LAB — CYCLIC CITRULLINATED PEPTIDE ANTIBODY IGG: 1.7 U/ML (ref 0–7)

## 2022-07-30 ENCOUNTER — APPOINTMENT (OUTPATIENT)
Dept: GENERAL RADIOLOGY | Age: 62
End: 2022-07-30
Payer: COMMERCIAL

## 2022-07-30 ENCOUNTER — APPOINTMENT (OUTPATIENT)
Dept: CT IMAGING | Age: 62
End: 2022-07-30
Payer: COMMERCIAL

## 2022-07-30 ENCOUNTER — HOSPITAL ENCOUNTER (EMERGENCY)
Age: 62
Discharge: HOME | End: 2022-07-30
Attending: EMERGENCY MEDICINE
Payer: COMMERCIAL

## 2022-07-30 VITALS
TEMPERATURE: 98.3 F | BODY MASS INDEX: 40.74 KG/M2 | RESPIRATION RATE: 18 BRPM | SYSTOLIC BLOOD PRESSURE: 142 MMHG | WEIGHT: 230 LBS | OXYGEN SATURATION: 95 % | HEART RATE: 70 BPM | DIASTOLIC BLOOD PRESSURE: 80 MMHG

## 2022-07-30 DIAGNOSIS — U07.1 COVID-19: Primary | ICD-10-CM

## 2022-07-30 DIAGNOSIS — R06.02 SHORTNESS OF BREATH: ICD-10-CM

## 2022-07-30 DIAGNOSIS — R91.1 PULMONARY NODULE: ICD-10-CM

## 2022-07-30 LAB
ALBUMIN SERPL-MCNC: 4.1 G/DL (ref 3.5–5.2)
ALP BLD-CCNC: 98 U/L (ref 35–104)
ALT SERPL-CCNC: 20 U/L (ref 0–32)
ANION GAP SERPL CALCULATED.3IONS-SCNC: 9 MMOL/L (ref 7–16)
AST SERPL-CCNC: 20 U/L (ref 0–31)
BACTERIA: ABNORMAL /HPF
BASOPHILS ABSOLUTE: 0.04 E9/L (ref 0–0.2)
BASOPHILS RELATIVE PERCENT: 0.4 % (ref 0–2)
BILIRUB SERPL-MCNC: 0.4 MG/DL (ref 0–1.2)
BILIRUBIN URINE: NEGATIVE
BLOOD, URINE: NEGATIVE
BUN BLDV-MCNC: 11 MG/DL (ref 6–23)
CALCIUM SERPL-MCNC: 9.2 MG/DL (ref 8.6–10.2)
CHLORIDE BLD-SCNC: 105 MMOL/L (ref 98–107)
CLARITY: CLEAR
CO2: 28 MMOL/L (ref 22–29)
COLOR: YELLOW
CREAT SERPL-MCNC: 1 MG/DL (ref 0.5–1)
D DIMER: 655 NG/ML DDU
EKG ATRIAL RATE: 57 BPM
EKG P AXIS: 29 DEGREES
EKG P-R INTERVAL: 144 MS
EKG Q-T INTERVAL: 442 MS
EKG QRS DURATION: 76 MS
EKG QTC CALCULATION (BAZETT): 430 MS
EKG R AXIS: -2 DEGREES
EKG T AXIS: 22 DEGREES
EKG VENTRICULAR RATE: 57 BPM
EOSINOPHILS ABSOLUTE: 0.35 E9/L (ref 0.05–0.5)
EOSINOPHILS RELATIVE PERCENT: 3.3 % (ref 0–6)
EPITHELIAL CELLS, UA: ABNORMAL /HPF
GFR AFRICAN AMERICAN: >60
GFR NON-AFRICAN AMERICAN: 56 ML/MIN/1.73
GLUCOSE BLD-MCNC: 94 MG/DL (ref 74–99)
GLUCOSE URINE: NEGATIVE MG/DL
HCT VFR BLD CALC: 42.3 % (ref 34–48)
HEMOGLOBIN: 14.1 G/DL (ref 11.5–15.5)
IMMATURE GRANULOCYTES #: 0.03 E9/L
IMMATURE GRANULOCYTES %: 0.3 % (ref 0–5)
KETONES, URINE: NEGATIVE MG/DL
LEUKOCYTE ESTERASE, URINE: NEGATIVE
LYMPHOCYTES ABSOLUTE: 3.95 E9/L (ref 1.5–4)
LYMPHOCYTES RELATIVE PERCENT: 37.4 % (ref 20–42)
MCH RBC QN AUTO: 31.9 PG (ref 26–35)
MCHC RBC AUTO-ENTMCNC: 33.3 % (ref 32–34.5)
MCV RBC AUTO: 95.7 FL (ref 80–99.9)
MONOCYTES ABSOLUTE: 0.66 E9/L (ref 0.1–0.95)
MONOCYTES RELATIVE PERCENT: 6.3 % (ref 2–12)
MUCUS: PRESENT /LPF
NEUTROPHILS ABSOLUTE: 5.52 E9/L (ref 1.8–7.3)
NEUTROPHILS RELATIVE PERCENT: 52.3 % (ref 43–80)
NITRITE, URINE: NEGATIVE
PDW BLD-RTO: 14.8 FL (ref 11.5–15)
PH UA: 6 (ref 5–9)
PLATELET # BLD: 375 E9/L (ref 130–450)
PMV BLD AUTO: 10.4 FL (ref 7–12)
POTASSIUM REFLEX MAGNESIUM: 4.2 MMOL/L (ref 3.5–5)
PRO-BNP: 67 PG/ML (ref 0–125)
PROTEIN UA: NEGATIVE MG/DL
RBC # BLD: 4.42 E12/L (ref 3.5–5.5)
RBC UA: ABNORMAL /HPF (ref 0–2)
SARS-COV-2, NAAT: DETECTED
SODIUM BLD-SCNC: 142 MMOL/L (ref 132–146)
SPECIFIC GRAVITY UA: 1.02 (ref 1–1.03)
TOTAL PROTEIN: 6.8 G/DL (ref 6.4–8.3)
TROPONIN, HIGH SENSITIVITY: 7 NG/L (ref 0–9)
UROBILINOGEN, URINE: 0.2 E.U./DL
WBC # BLD: 10.6 E9/L (ref 4.5–11.5)
WBC UA: ABNORMAL /HPF (ref 0–5)

## 2022-07-30 PROCEDURE — 85378 FIBRIN DEGRADE SEMIQUANT: CPT

## 2022-07-30 PROCEDURE — 85025 COMPLETE CBC W/AUTO DIFF WBC: CPT

## 2022-07-30 PROCEDURE — 6360000004 HC RX CONTRAST MEDICATION: Performed by: RADIOLOGY

## 2022-07-30 PROCEDURE — 71275 CT ANGIOGRAPHY CHEST: CPT

## 2022-07-30 PROCEDURE — 71045 X-RAY EXAM CHEST 1 VIEW: CPT

## 2022-07-30 PROCEDURE — 6370000000 HC RX 637 (ALT 250 FOR IP): Performed by: STUDENT IN AN ORGANIZED HEALTH CARE EDUCATION/TRAINING PROGRAM

## 2022-07-30 PROCEDURE — 94664 DEMO&/EVAL PT USE INHALER: CPT

## 2022-07-30 PROCEDURE — 84484 ASSAY OF TROPONIN QUANT: CPT

## 2022-07-30 PROCEDURE — 81001 URINALYSIS AUTO W/SCOPE: CPT

## 2022-07-30 PROCEDURE — 93005 ELECTROCARDIOGRAM TRACING: CPT | Performed by: STUDENT IN AN ORGANIZED HEALTH CARE EDUCATION/TRAINING PROGRAM

## 2022-07-30 PROCEDURE — 87635 SARS-COV-2 COVID-19 AMP PRB: CPT

## 2022-07-30 PROCEDURE — 80053 COMPREHEN METABOLIC PANEL: CPT

## 2022-07-30 PROCEDURE — 83880 ASSAY OF NATRIURETIC PEPTIDE: CPT

## 2022-07-30 PROCEDURE — 99285 EMERGENCY DEPT VISIT HI MDM: CPT

## 2022-07-30 RX ORDER — IPRATROPIUM BROMIDE AND ALBUTEROL SULFATE 2.5; .5 MG/3ML; MG/3ML
1 SOLUTION RESPIRATORY (INHALATION)
Status: DISCONTINUED | OUTPATIENT
Start: 2022-07-30 | End: 2022-07-30 | Stop reason: HOSPADM

## 2022-07-30 RX ADMIN — IPRATROPIUM BROMIDE AND ALBUTEROL SULFATE 1 AMPULE: .5; 2.5 SOLUTION RESPIRATORY (INHALATION) at 10:13

## 2022-07-30 RX ADMIN — IOPAMIDOL 75 ML: 755 INJECTION, SOLUTION INTRAVENOUS at 12:21

## 2022-07-30 ASSESSMENT — ENCOUNTER SYMPTOMS
VOMITING: 0
ABDOMINAL PAIN: 0
COUGH: 1
DIARRHEA: 0
EYE PAIN: 0
SORE THROAT: 0
BACK PAIN: 0
SHORTNESS OF BREATH: 1
WHEEZING: 0
NAUSEA: 0

## 2022-07-30 NOTE — ED PROVIDER NOTES
Chief Complaint   Patient presents with    Shortness of Breath     Started on Tuesday. Tested Positive for COVID on Tuesday. VERONICA Jensen is a 58 y.o. old female with a past medical history of a gastric bypass and blood clots not on anticoagulation presenting to the emergency department with a complaint of shortness of breath that has been ongoing for the past 5 days. Patient developed fatigue, body aches, congestion, and poor appetite which led her to get a at-home COVID test which was positive. She tested again yesterday and was also positive. She reports increased dyspnea on exertion. She has a pulse oximeter at home which has been been reading 95% while at rest.  She has a mild headache without vision changes. She denies any associated chest pain, wheezing, abdominal pain, diarrhea, constipation, or dysuria. Review of Systems   Constitutional:  Positive for appetite change and fatigue. Negative for chills and fever. HENT:  Negative for ear pain and sore throat. Eyes:  Negative for pain. Respiratory:  Positive for cough and shortness of breath. Negative for wheezing. Cardiovascular:  Negative for chest pain and leg swelling. Gastrointestinal:  Negative for abdominal pain, diarrhea, nausea and vomiting. Genitourinary:  Negative for flank pain. Musculoskeletal:  Negative for back pain and neck pain. Skin:  Negative for rash. Neurological:  Positive for light-headedness and headaches. Psychiatric/Behavioral:  Negative for behavioral problems. The patient is not nervous/anxious. Physical Exam  Constitutional:       General: She is not in acute distress. Appearance: Normal appearance. She is obese. She is not ill-appearing. HENT:      Head: Normocephalic and atraumatic.       Right Ear: External ear normal.      Left Ear: External ear normal.      Nose: Nose normal.      Mouth/Throat:      Mouth: Mucous membranes are moist.      Pharynx: No oropharyngeal 07/30/22 1626   Sat Jul 30, 2022   1110 Lab results were discussed with the patient. She reported mild improvement after the DuoNeb's. Patient tolerated ambulation with pulse oximetry. She is agreeable with obtaining a CTA to rule out pulmonary embolism due to elevated D-dimer. [TO]      ED Course User Index  [TO] Jose Vallejo DO       --------------------------------------------- PAST HISTORY ---------------------------------------------  Past Medical History:  has a past medical history of Arrhythmia, Auto immune neutropenia ([address] Hx of blood clots, Hyperlipidemia, ITP (idiopathic thrombocytopenic purpura), Liver disease, Liver enzyme elevation, Migraine, Migraine, Occipital neuralgia, Osteoporosis, PONV (postoperative nausea and vomiting), and Thyroid disease. Past Surgical History:  has a past surgical history that includes Splenectomy; Cholecystectomy; Gastric bypass surgery; Colonoscopy (12/28/2011); Endometrial ablation; Spinal fusion; Diagnostic Cardiac Cath Lab Procedure (May 2000); back surgery (2010); joint replacement (2012); joint replacement (2015); fracture surgery (Left); other surgical history; Wrist fracture surgery; other surgical history (2013); and other surgical history (Right, 04/06/2017). Social History:  reports that she has never smoked. She has never used smokeless tobacco. She reports current alcohol use. She reports that she does not use drugs. Family History: family history includes Arthritis in her brother; High Blood Pressure in her father. The patients home medications have been reviewed.     Allergies: Sulfites, Aspirin, Ceclor [cefaclor], Kiwi extract, Tape [adhesive tape], Topamax, and Other    -------------------------------------------------- RESULTS -------------------------------------------------  Labs:  Results for orders placed or performed during the hospital encounter of 07/30/22   COVID-19, Rapid    Specimen: Nasopharyngeal Swab   Result Value Ref Range SARS-CoV-2, NAAT DETECTED (A) Not Detected   CBC with Auto Differential   Result Value Ref Range    WBC 10.6 4.5 - 11.5 E9/L    RBC 4.42 3.50 - 5.50 E12/L    Hemoglobin 14.1 11.5 - 15.5 g/dL    Hematocrit 42.3 34.0 - 48.0 %    MCV 95.7 80.0 - 99.9 fL    MCH 31.9 26.0 - 35.0 pg    MCHC 33.3 32.0 - 34.5 %    RDW 14.8 11.5 - 15.0 fL    Platelets 584 466 - 474 E9/L    MPV 10.4 7.0 - 12.0 fL    Neutrophils % 52.3 43.0 - 80.0 %    Immature Granulocytes % 0.3 0.0 - 5.0 %    Lymphocytes % 37.4 20.0 - 42.0 %    Monocytes % 6.3 2.0 - 12.0 %    Eosinophils % 3.3 0.0 - 6.0 %    Basophils % 0.4 0.0 - 2.0 %    Neutrophils Absolute 5.52 1.80 - 7.30 E9/L    Immature Granulocytes # 0.03 E9/L    Lymphocytes Absolute 3.95 1.50 - 4.00 E9/L    Monocytes Absolute 0.66 0.10 - 0.95 E9/L    Eosinophils Absolute 0.35 0.05 - 0.50 E9/L    Basophils Absolute 0.04 0.00 - 0.20 E9/L   Comprehensive Metabolic Panel w/ Reflex to MG   Result Value Ref Range    Sodium 142 132 - 146 mmol/L    Potassium reflex Magnesium 4.2 3.5 - 5.0 mmol/L    Chloride 105 98 - 107 mmol/L    CO2 28 22 - 29 mmol/L    Anion Gap 9 7 - 16 mmol/L    Glucose 94 74 - 99 mg/dL    BUN 11 6 - 23 mg/dL    Creatinine 1.0 0.5 - 1.0 mg/dL    GFR Non-African American 56 >=60 mL/min/1.73    GFR African American >60     Calcium 9.2 8.6 - 10.2 mg/dL    Total Protein 6.8 6.4 - 8.3 g/dL    Albumin 4.1 3.5 - 5.2 g/dL    Total Bilirubin 0.4 0.0 - 1.2 mg/dL    Alkaline Phosphatase 98 35 - 104 U/L    ALT 20 0 - 32 U/L    AST 20 0 - 31 U/L   Troponin   Result Value Ref Range    Troponin, High Sensitivity 7 0 - 9 ng/L   Brain Natriuretic Peptide   Result Value Ref Range    Pro-BNP 67 0 - 125 pg/mL   Urinalysis with Microscopic   Result Value Ref Range    Color, UA Yellow Straw/Yellow    Clarity, UA Clear Clear    Glucose, Ur Negative Negative mg/dL    Bilirubin Urine Negative Negative    Ketones, Urine Negative Negative mg/dL    Specific Gravity, UA 1.025 1.005 - 1.030    Blood, Urine

## 2022-09-29 ENCOUNTER — HOSPITAL ENCOUNTER (EMERGENCY)
Age: 62
Discharge: HOME | End: 2022-09-29
Attending: EMERGENCY MEDICINE
Payer: COMMERCIAL

## 2022-09-29 VITALS
BODY MASS INDEX: 40.75 KG/M2 | WEIGHT: 230 LBS | OXYGEN SATURATION: 99 % | RESPIRATION RATE: 16 BRPM | HEIGHT: 63 IN | TEMPERATURE: 97.6 F | DIASTOLIC BLOOD PRESSURE: 81 MMHG | SYSTOLIC BLOOD PRESSURE: 148 MMHG | HEART RATE: 68 BPM

## 2022-09-29 DIAGNOSIS — R19.7 NAUSEA VOMITING AND DIARRHEA: Primary | ICD-10-CM

## 2022-09-29 DIAGNOSIS — R11.2 NAUSEA VOMITING AND DIARRHEA: Primary | ICD-10-CM

## 2022-09-29 LAB
ALBUMIN SERPL-MCNC: 4.1 G/DL (ref 3.5–5.2)
ALP BLD-CCNC: 119 U/L (ref 35–104)
ALT SERPL-CCNC: 20 U/L (ref 0–32)
ANION GAP SERPL CALCULATED.3IONS-SCNC: 14 MMOL/L (ref 7–16)
AST SERPL-CCNC: 27 U/L (ref 0–31)
BASOPHILS ABSOLUTE: 0.04 E9/L (ref 0–0.2)
BASOPHILS RELATIVE PERCENT: 0.3 % (ref 0–2)
BILIRUB SERPL-MCNC: 0.3 MG/DL (ref 0–1.2)
BUN BLDV-MCNC: 11 MG/DL (ref 6–23)
CALCIUM SERPL-MCNC: 9.7 MG/DL (ref 8.6–10.2)
CHLORIDE BLD-SCNC: 105 MMOL/L (ref 98–107)
CO2: 20 MMOL/L (ref 22–29)
CREAT SERPL-MCNC: 0.9 MG/DL (ref 0.5–1)
EKG ATRIAL RATE: 73 BPM
EKG P AXIS: 41 DEGREES
EKG P-R INTERVAL: 130 MS
EKG Q-T INTERVAL: 408 MS
EKG QRS DURATION: 70 MS
EKG QTC CALCULATION (BAZETT): 449 MS
EKG R AXIS: 13 DEGREES
EKG T AXIS: 21 DEGREES
EKG VENTRICULAR RATE: 73 BPM
EOSINOPHILS ABSOLUTE: 0.09 E9/L (ref 0.05–0.5)
EOSINOPHILS RELATIVE PERCENT: 0.7 % (ref 0–6)
GFR AFRICAN AMERICAN: >60
GFR NON-AFRICAN AMERICAN: >60 ML/MIN/1.73
GLUCOSE BLD-MCNC: 89 MG/DL (ref 74–99)
HCT VFR BLD CALC: 43.2 % (ref 34–48)
HEMOGLOBIN: 14.7 G/DL (ref 11.5–15.5)
IMMATURE GRANULOCYTES #: 0.04 E9/L
IMMATURE GRANULOCYTES %: 0.3 % (ref 0–5)
LIPASE: 24 U/L (ref 13–60)
LYMPHOCYTES ABSOLUTE: 3.29 E9/L (ref 1.5–4)
LYMPHOCYTES RELATIVE PERCENT: 25.4 % (ref 20–42)
MCH RBC QN AUTO: 32.1 PG (ref 26–35)
MCHC RBC AUTO-ENTMCNC: 34 % (ref 32–34.5)
MCV RBC AUTO: 94.3 FL (ref 80–99.9)
MONOCYTES ABSOLUTE: 0.86 E9/L (ref 0.1–0.95)
MONOCYTES RELATIVE PERCENT: 6.6 % (ref 2–12)
NEUTROPHILS ABSOLUTE: 8.63 E9/L (ref 1.8–7.3)
NEUTROPHILS RELATIVE PERCENT: 66.7 % (ref 43–80)
PDW BLD-RTO: 14.6 FL (ref 11.5–15)
PLATELET # BLD: 376 E9/L (ref 130–450)
PMV BLD AUTO: 10.7 FL (ref 7–12)
POTASSIUM SERPL-SCNC: 3.8 MMOL/L (ref 3.5–5)
RBC # BLD: 4.58 E12/L (ref 3.5–5.5)
SARS-COV-2, NAAT: NOT DETECTED
SODIUM BLD-SCNC: 139 MMOL/L (ref 132–146)
TOTAL PROTEIN: 7.4 G/DL (ref 6.4–8.3)
TROPONIN, HIGH SENSITIVITY: 10 NG/L (ref 0–9)
TROPONIN, HIGH SENSITIVITY: 23 NG/L (ref 0–9)
WBC # BLD: 13 E9/L (ref 4.5–11.5)

## 2022-09-29 PROCEDURE — 87635 SARS-COV-2 COVID-19 AMP PRB: CPT

## 2022-09-29 PROCEDURE — 85025 COMPLETE CBC W/AUTO DIFF WBC: CPT

## 2022-09-29 PROCEDURE — 6360000002 HC RX W HCPCS: Performed by: EMERGENCY MEDICINE

## 2022-09-29 PROCEDURE — 83690 ASSAY OF LIPASE: CPT

## 2022-09-29 PROCEDURE — 36415 COLL VENOUS BLD VENIPUNCTURE: CPT

## 2022-09-29 PROCEDURE — 84484 ASSAY OF TROPONIN QUANT: CPT

## 2022-09-29 PROCEDURE — 96374 THER/PROPH/DIAG INJ IV PUSH: CPT

## 2022-09-29 PROCEDURE — 99284 EMERGENCY DEPT VISIT MOD MDM: CPT

## 2022-09-29 PROCEDURE — 2580000003 HC RX 258: Performed by: EMERGENCY MEDICINE

## 2022-09-29 PROCEDURE — 93005 ELECTROCARDIOGRAM TRACING: CPT | Performed by: EMERGENCY MEDICINE

## 2022-09-29 PROCEDURE — 96361 HYDRATE IV INFUSION ADD-ON: CPT

## 2022-09-29 PROCEDURE — 80053 COMPREHEN METABOLIC PANEL: CPT

## 2022-09-29 RX ORDER — ONDANSETRON 4 MG/1
4 TABLET, ORALLY DISINTEGRATING ORAL EVERY 8 HOURS PRN
Qty: 10 TABLET | Refills: 0 | Status: SHIPPED | OUTPATIENT
Start: 2022-09-29

## 2022-09-29 RX ORDER — ONDANSETRON 2 MG/ML
4 INJECTION INTRAMUSCULAR; INTRAVENOUS ONCE
Status: COMPLETED | OUTPATIENT
Start: 2022-09-29 | End: 2022-09-29

## 2022-09-29 RX ORDER — 0.9 % SODIUM CHLORIDE 0.9 %
1000 INTRAVENOUS SOLUTION INTRAVENOUS ONCE
Status: COMPLETED | OUTPATIENT
Start: 2022-09-29 | End: 2022-09-29

## 2022-09-29 RX ADMIN — ONDANSETRON 4 MG: 2 INJECTION INTRAMUSCULAR; INTRAVENOUS at 12:34

## 2022-09-29 RX ADMIN — SODIUM CHLORIDE 1000 ML: 9 INJECTION, SOLUTION INTRAVENOUS at 12:34

## 2022-09-29 ASSESSMENT — ENCOUNTER SYMPTOMS
NAUSEA: 1
DIARRHEA: 1
ABDOMINAL PAIN: 0
CHEST TIGHTNESS: 0
SHORTNESS OF BREATH: 0
VOMITING: 1

## 2022-09-29 ASSESSMENT — PAIN - FUNCTIONAL ASSESSMENT: PAIN_FUNCTIONAL_ASSESSMENT: NONE - DENIES PAIN

## 2022-09-29 NOTE — ED PROVIDER NOTES
58-year-old female presenting from home with nausea and vomiting diarrhea for couple of days. She is worried about not taking her mental health medications with all of this diarrhea and vomiting. Upon arrival she is awake, alert, oriented x4. No abdominal pain or chest pain or shortness of breath. No syncope or lightheadedness. New problem, persistent, mild to moderate severity, associated with the diarrhea as well. Family History   Problem Relation Age of Onset    High Blood Pressure Father     Arthritis Brother      Past Surgical History:   Procedure Laterality Date    BACK SURGERY  2010    removal of titanium plate cervical spine    CHOLECYSTECTOMY      COLONOSCOPY  12/28/2011    neg    DIAGNOSTIC CARDIAC CATH LAB PROCEDURE  May 2000    Dr. Zazueta Carnegie : Normal coronaries, EF is 66%    ENDOMETRIAL ABLATION      FRACTURE SURGERY Left      femur    GASTRIC BYPASS SURGERY      JOINT REPLACEMENT  2012    total right knee replacement    JOINT REPLACEMENT  2015    total left knee    OTHER SURGICAL HISTORY      cervical Spinal cord stimulator  in [place]  2013    left greater occipital radiofreqency ablation    OTHER SURGICAL HISTORY Right 04/06/2017    Excision of cyst on ring finger    SPINAL FUSION      spinal fusion of C5 and C6 then hardware was removed    SPLENECTOMY      d/t ITP    WRIST FRACTURE SURGERY      multiple 7 right   1 on left       Review of Systems   Constitutional:  Positive for chills and fever. Respiratory:  Negative for chest tightness and shortness of breath. Cardiovascular:  Negative for chest pain and palpitations. Gastrointestinal:  Positive for diarrhea, nausea and vomiting. Negative for abdominal pain. All other systems reviewed and are negative. Physical Exam  Constitutional:       General: She is not in acute distress. Appearance: She is well-developed. HENT:      Head: Normocephalic and atraumatic.    Eyes:      Conjunctiva/sclera: Conjunctivae normal.      Pupils: Pupils are equal, round, and reactive to light. Neck:      Thyroid: No thyromegaly. Cardiovascular:      Rate and Rhythm: Normal rate and regular rhythm. Pulmonary:      Effort: Pulmonary effort is normal. No respiratory distress. Breath sounds: Normal breath sounds. Abdominal:      General: There is no distension. Palpations: Abdomen is soft. Tenderness: There is no abdominal tenderness. There is no guarding or rebound. Musculoskeletal:         General: No tenderness. Normal range of motion. Cervical back: Normal range of motion. Skin:     General: Skin is warm and dry. Findings: No erythema. Neurological:      Mental Status: She is alert and oriented to person, place, and time. Cranial Nerves: No cranial nerve deficit. Coordination: Coordination normal.        Procedures     MDM       ED Course as of 09/30/22 1054   Thu Sep 29, 2022   1232 EKG: Interpreted by me  Sinus rhythm, rate 73, normal axis, no ST elevations or depressions, some T wave flattening throughout the precordial leads. [SO]      ED Course User Index  [SO] Doryreilly KamilaciciDO             ED Course as of 09/30/22 1055   Thu Sep 29, 2022   1232 EKG: Interpreted by me  Sinus rhythm, rate 73, normal axis, no ST elevations or depressions, some T wave flattening throughout the precordial leads. [SO]      ED Course User Index  [SO] Janay García DO       --------------------------------------------- PAST HISTORY ---------------------------------------------  Past Medical History:  has a past medical history of Arrhythmia, Auto immune neutropenia ([address] Hx of blood clots, Hyperlipidemia, ITP (idiopathic thrombocytopenic purpura), Liver disease, Liver enzyme elevation, Migraine, Migraine, Occipital neuralgia, Osteoporosis, PONV (postoperative nausea and vomiting), and Thyroid disease.     Past Surgical History:  has a past surgical history that includes Splenectomy; Cholecystectomy; Gastric bypass surgery; Colonoscopy (12/28/2011); Endometrial ablation; Spinal fusion; Diagnostic Cardiac Cath Lab Procedure (May 2000); back surgery (2010); joint replacement (2012); joint replacement (2015); fracture surgery (Left); other surgical history; Wrist fracture surgery; other surgical history (2013); and other surgical history (Right, 04/06/2017). Social History:  reports that she has never smoked. She has never used smokeless tobacco. She reports current alcohol use. She reports that she does not use drugs. Family History: family history includes Arthritis in her brother; High Blood Pressure in her father. The patients home medications have been reviewed.     Allergies: Sulfites, Aspirin, Ceclor [cefaclor], Kiwi extract, Tape [adhesive tape], Topamax, and Other    -------------------------------------------------- RESULTS -------------------------------------------------  Labs:  Results for orders placed or performed during the hospital encounter of 09/29/22   COVID-19, Rapid    Specimen: Nasopharyngeal Swab   Result Value Ref Range    SARS-CoV-2, NAAT Not Detected Not Detected   CBC with Auto Differential   Result Value Ref Range    WBC 13.0 (H) 4.5 - 11.5 E9/L    RBC 4.58 3.50 - 5.50 E12/L    Hemoglobin 14.7 11.5 - 15.5 g/dL    Hematocrit 43.2 34.0 - 48.0 %    MCV 94.3 80.0 - 99.9 fL    MCH 32.1 26.0 - 35.0 pg    MCHC 34.0 32.0 - 34.5 %    RDW 14.6 11.5 - 15.0 fL    Platelets 440 934 - 319 E9/L    MPV 10.7 7.0 - 12.0 fL    Neutrophils % 66.7 43.0 - 80.0 %    Immature Granulocytes % 0.3 0.0 - 5.0 %    Lymphocytes % 25.4 20.0 - 42.0 %    Monocytes % 6.6 2.0 - 12.0 %    Eosinophils % 0.7 0.0 - 6.0 %    Basophils % 0.3 0.0 - 2.0 %    Neutrophils Absolute 8.63 (H) 1.80 - 7.30 E9/L    Immature Granulocytes # 0.04 E9/L    Lymphocytes Absolute 3.29 1.50 - 4.00 E9/L    Monocytes Absolute 0.86 0.10 - 0.95 E9/L    Eosinophils Absolute 0.09 0.05 - 0.50 E9/L    Basophils Absolute 0.04 0.00 - 0.20 E9/L   Comprehensive Metabolic Panel   Result Value Ref Range    Sodium 139 132 - 146 mmol/L    Potassium 3.8 3.5 - 5.0 mmol/L    Chloride 105 98 - 107 mmol/L    CO2 20 (L) 22 - 29 mmol/L    Anion Gap 14 7 - 16 mmol/L    Glucose 89 74 - 99 mg/dL    BUN 11 6 - 23 mg/dL    Creatinine 0.9 0.5 - 1.0 mg/dL    GFR Non-African American >60 >=60 mL/min/1.73    GFR African American >60     Calcium 9.7 8.6 - 10.2 mg/dL    Total Protein 7.4 6.4 - 8.3 g/dL    Albumin 4.1 3.5 - 5.2 g/dL    Total Bilirubin 0.3 0.0 - 1.2 mg/dL    Alkaline Phosphatase 119 (H) 35 - 104 U/L    ALT 20 0 - 32 U/L    AST 27 0 - 31 U/L   Lipase   Result Value Ref Range    Lipase 24 13 - 60 U/L   Troponin   Result Value Ref Range    Troponin, High Sensitivity 23 (H) 0 - 9 ng/L   Troponin   Result Value Ref Range    Troponin, High Sensitivity 10 (H) 0 - 9 ng/L   EKG 12 Lead   Result Value Ref Range    Ventricular Rate 73 BPM    Atrial Rate 73 BPM    P-R Interval 130 ms    QRS Duration 70 ms    Q-T Interval 408 ms    QTc Calculation (Bazett) 449 ms    P Axis 41 degrees    R Axis 13 degrees    T Axis 21 degrees       Radiology:  No orders to display       ------------------------- NURSING NOTES AND VITALS REVIEWED ---------------------------  Date / Time Roomed:  9/29/2022 11:45 AM  ED Bed Assignment:  HANDY/HANDY    The nursing notes within the ED encounter and vital signs as below have been reviewed. BP (!) 148/81   Pulse 68   Temp 97.6 °F (36.4 °C) (Oral)   Resp 16   Ht 5' 3\" (1.6 m)   Wt 230 lb (104.3 kg)   SpO2 99%   BMI 40.74 kg/m²   Oxygen Saturation Interpretation: Normal      ------------------------------------------ PROGRESS NOTES ------------------------------------------  I have spoken with the patient and discussed todays results, in addition to providing specific details for the plan of care and counseling regarding the diagnosis and prognosis. Their questions are answered at this time and they are agreeable with the plan.  I discussed at length with them reasons for immediate return here for re evaluation. They will followup with primary care by calling their office tomorrow. --------------------------------- ADDITIONAL PROVIDER NOTES ---------------------------------  At this time the patient is without objective evidence of an acute process requiring hospitalization or inpatient management. They have remained hemodynamically stable throughout their entire ED visit and are stable for discharge with outpatient follow-up. The plan has been discussed in detail and they are aware of the specific conditions for emergent return, as well as the importance of follow-up. Discharge Medication List as of 9/29/2022  5:03 PM        START taking these medications    Details   ondansetron (ZOFRAN ODT) 4 MG disintegrating tablet Take 1 tablet by mouth every 8 hours as needed for Nausea, Disp-10 tablet, R-0Print             Diagnosis:  1. Nausea vomiting and diarrhea        Disposition:  Patient's disposition: Discharge to home  Patient's condition is stable.          Cheryl Eisenberg DO  09/30/22 1055

## 2022-12-29 ENCOUNTER — APPOINTMENT (OUTPATIENT)
Dept: GENERAL RADIOLOGY | Age: 62
End: 2022-12-29
Payer: COMMERCIAL

## 2022-12-29 ENCOUNTER — HOSPITAL ENCOUNTER (EMERGENCY)
Age: 62
Discharge: HOME | End: 2022-12-29
Attending: EMERGENCY MEDICINE
Payer: COMMERCIAL

## 2022-12-29 VITALS
WEIGHT: 230 LBS | HEIGHT: 63 IN | SYSTOLIC BLOOD PRESSURE: 109 MMHG | DIASTOLIC BLOOD PRESSURE: 64 MMHG | TEMPERATURE: 97.6 F | RESPIRATION RATE: 18 BRPM | OXYGEN SATURATION: 95 % | HEART RATE: 83 BPM | BODY MASS INDEX: 40.75 KG/M2

## 2022-12-29 DIAGNOSIS — R50.9 FEBRILE ILLNESS, ACUTE: Primary | ICD-10-CM

## 2022-12-29 LAB
ANION GAP SERPL CALCULATED.3IONS-SCNC: 16 MMOL/L (ref 7–16)
BACTERIA: ABNORMAL /HPF
BILIRUBIN URINE: NEGATIVE
BLOOD, URINE: NEGATIVE
BUN BLDV-MCNC: 10 MG/DL (ref 6–23)
CALCIUM SERPL-MCNC: 9 MG/DL (ref 8.6–10.2)
CHLORIDE BLD-SCNC: 102 MMOL/L (ref 98–107)
CHP ED QC CHECK: YES
CLARITY: CLEAR
CO2: 17 MMOL/L (ref 22–29)
COLOR: ABNORMAL
CREAT SERPL-MCNC: 1 MG/DL (ref 0.5–1)
EPITHELIAL CELLS, UA: ABNORMAL /HPF
GFR SERPL CREATININE-BSD FRML MDRD: >60 ML/MIN/1.73
GLUCOSE BLD-MCNC: 114 MG/DL (ref 74–99)
GLUCOSE BLD-MCNC: 117 MG/DL
GLUCOSE URINE: NEGATIVE MG/DL
HCT VFR BLD CALC: 41.9 % (ref 34–48)
HEMOGLOBIN: 14.1 G/DL (ref 11.5–15.5)
INFLUENZA A BY PCR: NOT DETECTED
INFLUENZA B BY PCR: NOT DETECTED
KETONES, URINE: NEGATIVE MG/DL
LEUKOCYTE ESTERASE, URINE: ABNORMAL
MAGNESIUM: 2 MG/DL (ref 1.6–2.6)
MCH RBC QN AUTO: 31.4 PG (ref 26–35)
MCHC RBC AUTO-ENTMCNC: 33.7 % (ref 32–34.5)
MCV RBC AUTO: 93.3 FL (ref 80–99.9)
METER GLUCOSE: 117 MG/DL (ref 74–99)
NITRITE, URINE: NEGATIVE
PDW BLD-RTO: 14.2 FL (ref 11.5–15)
PH UA: 6 (ref 5–9)
PLATELET # BLD: 381 E9/L (ref 130–450)
PMV BLD AUTO: 10.3 FL (ref 7–12)
POTASSIUM SERPL-SCNC: 3.2 MMOL/L (ref 3.5–5)
PROTEIN UA: NEGATIVE MG/DL
RBC # BLD: 4.49 E12/L (ref 3.5–5.5)
RBC UA: ABNORMAL /HPF (ref 0–2)
SARS-COV-2, NAAT: NOT DETECTED
SODIUM BLD-SCNC: 135 MMOL/L (ref 132–146)
SPECIFIC GRAVITY UA: <=1.005 (ref 1–1.03)
TROPONIN, HIGH SENSITIVITY: 8 NG/L (ref 0–9)
TSH SERPL DL<=0.05 MIU/L-ACNC: 1.07 UIU/ML (ref 0.27–4.2)
UROBILINOGEN, URINE: 0.2 E.U./DL
WBC # BLD: 13.5 E9/L (ref 4.5–11.5)
WBC UA: ABNORMAL /HPF (ref 0–5)

## 2022-12-29 PROCEDURE — 87635 SARS-COV-2 COVID-19 AMP PRB: CPT

## 2022-12-29 PROCEDURE — 2580000003 HC RX 258: Performed by: EMERGENCY MEDICINE

## 2022-12-29 PROCEDURE — 99285 EMERGENCY DEPT VISIT HI MDM: CPT

## 2022-12-29 PROCEDURE — 87502 INFLUENZA DNA AMP PROBE: CPT

## 2022-12-29 PROCEDURE — 81001 URINALYSIS AUTO W/SCOPE: CPT

## 2022-12-29 PROCEDURE — 83735 ASSAY OF MAGNESIUM: CPT

## 2022-12-29 PROCEDURE — 94644 CONT INHLJ TX 1ST HOUR: CPT

## 2022-12-29 PROCEDURE — 85027 COMPLETE CBC AUTOMATED: CPT

## 2022-12-29 PROCEDURE — 84443 ASSAY THYROID STIM HORMONE: CPT

## 2022-12-29 PROCEDURE — 6370000000 HC RX 637 (ALT 250 FOR IP): Performed by: EMERGENCY MEDICINE

## 2022-12-29 PROCEDURE — 80048 BASIC METABOLIC PNL TOTAL CA: CPT

## 2022-12-29 PROCEDURE — 84484 ASSAY OF TROPONIN QUANT: CPT

## 2022-12-29 PROCEDURE — 82962 GLUCOSE BLOOD TEST: CPT

## 2022-12-29 PROCEDURE — 93005 ELECTROCARDIOGRAM TRACING: CPT | Performed by: EMERGENCY MEDICINE

## 2022-12-29 PROCEDURE — 71046 X-RAY EXAM CHEST 2 VIEWS: CPT

## 2022-12-29 RX ORDER — IPRATROPIUM BROMIDE AND ALBUTEROL SULFATE 2.5; .5 MG/3ML; MG/3ML
3 SOLUTION RESPIRATORY (INHALATION) ONCE
Status: COMPLETED | OUTPATIENT
Start: 2022-12-29 | End: 2022-12-29

## 2022-12-29 RX ORDER — POTASSIUM BICARBONATE 25 MEQ/1
50 TABLET, EFFERVESCENT ORAL ONCE
Status: COMPLETED | OUTPATIENT
Start: 2022-12-29 | End: 2022-12-29

## 2022-12-29 RX ORDER — 0.9 % SODIUM CHLORIDE 0.9 %
1000 INTRAVENOUS SOLUTION INTRAVENOUS ONCE
Status: COMPLETED | OUTPATIENT
Start: 2022-12-29 | End: 2022-12-29

## 2022-12-29 RX ORDER — ACETAMINOPHEN 500 MG
1000 TABLET ORAL ONCE
Status: COMPLETED | OUTPATIENT
Start: 2022-12-29 | End: 2022-12-29

## 2022-12-29 RX ADMIN — ACETAMINOPHEN 1000 MG: 500 TABLET ORAL at 16:23

## 2022-12-29 RX ADMIN — SODIUM CHLORIDE 1000 ML: 9 INJECTION, SOLUTION INTRAVENOUS at 16:48

## 2022-12-29 RX ADMIN — IPRATROPIUM BROMIDE AND ALBUTEROL SULFATE 3 AMPULE: .5; 2.5 SOLUTION RESPIRATORY (INHALATION) at 15:32

## 2022-12-29 RX ADMIN — POTASSIUM BICARBONATE 50 MEQ: 977.5 TABLET, EFFERVESCENT ORAL at 18:16

## 2022-12-29 ASSESSMENT — PAIN SCALES - GENERAL: PAINLEVEL_OUTOF10: 4

## 2022-12-29 ASSESSMENT — ENCOUNTER SYMPTOMS
DIARRHEA: 0
NAUSEA: 0
COUGH: 1
VOMITING: 0
SHORTNESS OF BREATH: 1
SORE THROAT: 0
EYE REDNESS: 0
SPUTUM PRODUCTION: 1
BACK PAIN: 0
ABDOMINAL DISTENTION: 0
SINUS PRESSURE: 0
EYE DISCHARGE: 0
ABDOMINAL PAIN: 0
EYE PAIN: 0
WHEEZING: 1

## 2022-12-29 ASSESSMENT — PAIN - FUNCTIONAL ASSESSMENT: PAIN_FUNCTIONAL_ASSESSMENT: 0-10

## 2022-12-29 ASSESSMENT — PAIN DESCRIPTION - LOCATION: LOCATION: GENERALIZED

## 2022-12-29 NOTE — ED PROVIDER NOTES
Patient reports about 2 weeks of upper respiratory type infection with cough and general malaise and fever and chills. She recently had a positive and a negative COVID test both on the same day at home. She describes increasing shortness of breath with phlegm production. Fever as well. No chest      Shortness of Breath  Severity:  Mild  Onset quality:  Sudden  Duration:  2 weeks  Timing:  Constant  Progression:  Worsening  Chronicity:  New  Relieved by:  Nothing  Worsened by:  Nothing  Ineffective treatments:  None tried  Associated symptoms: cough, diaphoresis, fever, sputum production and wheezing    Associated symptoms: no abdominal pain, no chest pain, no claudication, no ear pain, no headaches, no rash, no sore throat and no vomiting    Risk factors: obesity       Review of Systems   Constitutional:  Positive for activity change, appetite change, diaphoresis, fatigue and fever. Negative for chills. HENT:  Negative for ear pain, sinus pressure and sore throat. Eyes:  Negative for pain, discharge and redness. Respiratory:  Positive for cough, sputum production, shortness of breath and wheezing. Cardiovascular:  Negative for chest pain and claudication. Gastrointestinal:  Negative for abdominal distention, abdominal pain, diarrhea, nausea and vomiting. Genitourinary:  Negative for dysuria and frequency. Musculoskeletal:  Negative for arthralgias and back pain. Skin:  Negative for rash and wound. Neurological:  Negative for weakness and headaches. Hematological:  Negative for adenopathy. All other systems reviewed and are negative. Physical Exam  Vitals and nursing note reviewed. Constitutional:       Appearance: She is well-developed. She is obese. HENT:      Head: Normocephalic and atraumatic. Eyes:      Pupils: Pupils are equal, round, and reactive to light. Cardiovascular:      Rate and Rhythm: Regular rhythm. Tachycardia present. Heart sounds: Normal heart sounds. No murmur heard. Pulmonary:      Effort: Pulmonary effort is normal. No respiratory distress. Breath sounds: Decreased breath sounds, wheezing and rhonchi present. No rales. Abdominal:      General: Bowel sounds are normal.      Palpations: Abdomen is soft. Tenderness: There is no abdominal tenderness. There is no guarding or rebound. Musculoskeletal:      Cervical back: Normal range of motion and neck supple. Skin:     General: Skin is warm and dry. Neurological:      Mental Status: She is alert and oriented to person, place, and time. Cranial Nerves: No cranial nerve deficit. Coordination: Coordination normal.        Procedures     MDM       EKG: This EKG is signed and interpreted by me. Rate: 104  Rhythm: Sinus  Interpretation: no acute changes and non-specific EKG  Comparison: changes compared to previous EKG     7:18 PM EST  Discussed results with the patient. She states she feels much better. I question the validity of the COVID test performed here. She did have a positive COVID test at home with multiple symptoms and findings consistent with same. In any way, she is beyond any point for treatment with Paxlovid. We discussed management of symptoms at home and close outpatient follow-up.           --------------------------------------------- PAST HISTORY ---------------------------------------------  Past Medical History:  has a past medical history of Arrhythmia, Auto immune neutropenia ([facility] [address] Hx of blood clots, Hyperlipidemia, ITP (idiopathic thrombocytopenic purpura), Liver disease, Liver enzyme elevation, Migraine, Migraine, Occipital neuralgia, Osteoporosis, PONV (postoperative nausea and vomiting), and Thyroid disease. Past Surgical History:  has a past surgical history that includes Splenectomy; Cholecystectomy; Gastric bypass surgery; Colonoscopy (12/28/2011);  Endometrial ablation; Spinal fusion; Diagnostic Cardiac Cath Lab Procedure (May 2000); back surgery Result Value Ref Range    Magnesium 2.0 1.6 - 2.6 mg/dL   TSH   Result Value Ref Range    TSH 1.070 0.270 - 4.200 uIU/mL   Urinalysis   Result Value Ref Range    Color, UA Straw Straw/Yellow    Clarity, UA Clear Clear    Glucose, Ur Negative Negative mg/dL    Bilirubin Urine Negative Negative    Ketones, Urine Negative Negative mg/dL    Specific Gravity, UA <=1.005 1.005 - 1.030    Blood, Urine Negative Negative    pH, UA 6.0 5.0 - 9.0    Protein, UA Negative Negative mg/dL    Urobilinogen, Urine 0.2 <2.0 E.U./dL    Nitrite, Urine Negative Negative    Leukocyte Esterase, Urine TRACE (A) Negative   Microscopic Urinalysis   Result Value Ref Range    WBC, UA 1-3 0 - 5 /HPF    RBC, UA 0-1 0 - 2 /HPF    Epithelial Cells, UA FEW /HPF    Bacteria, UA FEW (A) None Seen /HPF   POCT Glucose   Result Value Ref Range    Glucose 117 mg/dL    QC OK? yes    POCT Glucose   Result Value Ref Range    Meter Glucose 117 (H) 74 - 99 mg/dL   EKG 12 Lead   Result Value Ref Range    Ventricular Rate 104 BPM    Atrial Rate 104 BPM    P-R Interval 132 ms    QRS Duration 74 ms    Q-T Interval 368 ms    QTc Calculation (Bazett) 483 ms    P Axis 67 degrees    R Axis 13 degrees    T Axis 32 degrees       Radiology:  XR CHEST (2 VW)   Final Result   No acute process. ------------------------- NURSING NOTES AND VITALS REVIEWED ---------------------------  Date / Time Roomed:  12/29/2022  3:05 PM  ED Bed Assignment:  17/17    The nursing notes within the ED encounter and vital signs as below have been reviewed.    /64   Pulse 83   Temp 97.6 °F (36.4 °C)   Resp 18   Ht 5' 3\" (1.6 m)   Wt 230 lb (104.3 kg)   SpO2 95%   BMI 40.74 kg/m²   Oxygen Saturation Interpretation: Normal      ------------------------------------------ PROGRESS NOTES ------------------------------------------  7:20 PM EST  I have spoken with the patient and discussed todays results, in addition to providing specific details for the plan of care and counseling regarding the diagnosis and prognosis. Their questions are answered at this time and they are agreeable with the plan. I discussed at length with them reasons for immediate return here for re evaluation. They will followup with their primary care physician by calling their office tomorrow. --------------------------------- ADDITIONAL PROVIDER NOTES ---------------------------------  At this time the patient is without objective evidence of an acute process requiring hospitalization or inpatient management. They have remained hemodynamically stable throughout their entire ED visit and are stable for discharge with outpatient follow-up. The plan has been discussed in detail and they are aware of the specific conditions for emergent return, as well as the importance of follow-up. New Prescriptions    No medications on file       Diagnosis:  1. Febrile illness, acute        Disposition:  Patient's disposition: Discharge to home  Patient's condition is stable.        Bulmaro Silverman DO  12/29/22 1920

## 2022-12-29 NOTE — ED NOTES
Patient ambulated to and from BR with her cane from home on R/a and was 95%-96%, HR 95.       Jeanne Ventura RN  12/29/22 5502

## 2022-12-30 LAB
EKG ATRIAL RATE: 104 BPM
EKG P AXIS: 67 DEGREES
EKG P-R INTERVAL: 132 MS
EKG Q-T INTERVAL: 368 MS
EKG QRS DURATION: 74 MS
EKG QTC CALCULATION (BAZETT): 483 MS
EKG R AXIS: 13 DEGREES
EKG T AXIS: 32 DEGREES
EKG VENTRICULAR RATE: 104 BPM

## 2022-12-30 PROCEDURE — 93010 ELECTROCARDIOGRAM REPORT: CPT | Performed by: INTERNAL MEDICINE

## 2023-01-25 ENCOUNTER — OFFICE VISIT (OUTPATIENT)
Dept: CARDIOLOGY CLINIC | Age: 63
End: 2023-01-25
Payer: COMMERCIAL

## 2023-01-25 VITALS
HEIGHT: 63 IN | DIASTOLIC BLOOD PRESSURE: 78 MMHG | WEIGHT: 224.8 LBS | BODY MASS INDEX: 39.83 KG/M2 | SYSTOLIC BLOOD PRESSURE: 128 MMHG | HEART RATE: 63 BPM | RESPIRATION RATE: 18 BRPM

## 2023-01-25 DIAGNOSIS — Z86.2 HISTORY OF ITP: ICD-10-CM

## 2023-01-25 DIAGNOSIS — M25.562 CHRONIC PAIN OF LEFT KNEE: ICD-10-CM

## 2023-01-25 DIAGNOSIS — E78.5 DYSLIPIDEMIA: ICD-10-CM

## 2023-01-25 DIAGNOSIS — R94.31 ABNORMAL EKG: Primary | ICD-10-CM

## 2023-01-25 DIAGNOSIS — R01.1 HEART MURMUR: ICD-10-CM

## 2023-01-25 DIAGNOSIS — E03.9 ACQUIRED HYPOTHYROIDISM: ICD-10-CM

## 2023-01-25 DIAGNOSIS — Z86.16 HISTORY OF COVID-19: ICD-10-CM

## 2023-01-25 DIAGNOSIS — Z98.84 HX OF GASTRIC BYPASS: ICD-10-CM

## 2023-01-25 DIAGNOSIS — R06.02 SOBOE (SHORTNESS OF BREATH ON EXERTION): ICD-10-CM

## 2023-01-25 DIAGNOSIS — G89.29 CHRONIC PAIN OF LEFT KNEE: ICD-10-CM

## 2023-01-25 DIAGNOSIS — R07.89 OTHER CHEST PAIN: ICD-10-CM

## 2023-01-25 PROCEDURE — G8427 DOCREV CUR MEDS BY ELIG CLIN: HCPCS | Performed by: INTERNAL MEDICINE

## 2023-01-25 PROCEDURE — G8417 CALC BMI ABV UP PARAM F/U: HCPCS | Performed by: INTERNAL MEDICINE

## 2023-01-25 PROCEDURE — G8484 FLU IMMUNIZE NO ADMIN: HCPCS | Performed by: INTERNAL MEDICINE

## 2023-01-25 PROCEDURE — 93000 ELECTROCARDIOGRAM COMPLETE: CPT | Performed by: INTERNAL MEDICINE

## 2023-01-25 PROCEDURE — 99244 OFF/OP CNSLTJ NEW/EST MOD 40: CPT | Performed by: INTERNAL MEDICINE

## 2023-01-25 RX ORDER — HYDROXYZINE PAMOATE 25 MG/1
25 CAPSULE ORAL 3 TIMES DAILY PRN
COMMUNITY

## 2023-01-25 RX ORDER — ATORVASTATIN CALCIUM 20 MG/1
20 TABLET, FILM COATED ORAL DAILY
COMMUNITY

## 2023-01-25 RX ORDER — NITROGLYCERIN 0.4 MG/1
0.4 TABLET SUBLINGUAL EVERY 5 MIN PRN
Qty: 25 TABLET | Refills: 3 | Status: SHIPPED | OUTPATIENT
Start: 2023-01-25

## 2023-01-25 RX ORDER — MULTIVIT-MIN/IRON/FOLIC ACID/K 18-600-40
2000 CAPSULE ORAL DAILY
COMMUNITY

## 2023-01-25 RX ORDER — ALENDRONATE SODIUM 70 MG/1
70 TABLET ORAL
COMMUNITY

## 2023-01-25 NOTE — PROGRESS NOTES
OFFICE VISIT     PRIMARY CARE PHYSICIAN:      Kelly Manley,        ALLERGIES / SENSITIVITIES:        Allergies   Allergen Reactions    Sulfites Hives, Shortness Of Breath and Itching     flush    Aspirin Other (See Comments)    Ceclor [Cefaclor] Itching and Swelling     Of face/throat    Kiwi Extract     Tape Donna Erp Tape] Other (See Comments)     SKIN IRRITATION    Topamax Other (See Comments)     JAW AND CHEST PAIN    Other Itching and Rash     blisters  Apples/kiwi  Itching in throat. Toothpaste environmental          REVIEWED MEDICATIONS:        Current Outpatient Medications:     alendronate (FOSAMAX) 70 MG tablet, Take 70 mg by mouth every 7 days, Disp: , Rfl:     Cholecalciferol (VITAMIN D) 50 MCG (2000 UT) CAPS capsule, Take 2,000 Units by mouth daily, Disp: , Rfl:     atorvastatin (LIPITOR) 20 MG tablet, Take 20 mg by mouth daily, Disp: , Rfl:     hydrOXYzine pamoate (VISTARIL) 25 MG capsule, Take 25 mg by mouth 3 times daily as needed for Itching, Disp: , Rfl:     nitroGLYCERIN (NITROSTAT) 0.4 MG SL tablet, Place 1 tablet under the tongue every 5 minutes as needed for Chest pain, Disp: 25 tablet, Rfl: 3    ondansetron (ZOFRAN ODT) 4 MG disintegrating tablet, Take 1 tablet by mouth every 8 hours as needed for Nausea, Disp: 10 tablet, Rfl: 0    DULoxetine (CYMBALTA) 30 MG extended release capsule, TAKE ONE CAPSULE BY MOUTH ONCE DAILY IN THE AFTERNOON, Disp: , Rfl:     BUPROPION HCL PO, Take 150 mg by mouth daily, Disp: , Rfl:     busPIRone (BUSPAR) 10 MG tablet, 10 mg in the morning, at noon, and at bedtime, Disp: , Rfl:     traZODone (DESYREL) 100 MG tablet, TAKE 1/2 TO 2 TABLETS BY MOUTH AT BEDTIME AS NEEDED FOR SLEEP, Disp: , Rfl:     levothyroxine (SYNTHROID) 88 MCG tablet, Take 88 mcg by mouth Daily, Disp: , Rfl:     duloxetine (CYMBALTA) 60 MG capsule, Take 60 mg by mouth daily , Disp: , Rfl:     Multiple Vitamin (MULTI-VITAMIN PO), Take 1 tablet by mouth daily.   , Disp: , Rfl:     Teriparatide, Recombinant, (FORTEO SC), Inject 20 mcg into the skin (Patient not taking: Reported on 1/25/2023), Disp: , Rfl:     simvastatin (ZOCOR) 20 MG tablet, Take 20 mg by mouth nightly. (Patient not taking: Reported on 1/25/2023), Disp: , Rfl:       S: REASON FOR VISIT:       Chief Complaint   Patient presents with    New Patient     Abnormal EKG- pt has complaints of chest discomfort, sob, dizziness          History of Present Illness:       New referral fro abnormal EKG, chest pain   58year old with hx of PVCs, dyslipidemia, Obesity, Rheumatoid arthritis referred fro Cp and abnormal EKG   Last I saw her in 2014. On and of CP, every other week, more with walking, med sternal, dull-ache, no associated Sx, she rub her chest and pain get better - lasts few seconds to minutes    C/o mild SOBOE for 6 months, sleeps on 1-2 pillows   Had COVID-19 in July 2022, and Dec 2022    C/o Left knee pain, chronic   No hospitalizations or surgeries recently   Patient is compliant with all medications   No palpitations, dizzy or syncope.    Active at home   Try to watch diet          Past Medical History:   Diagnosis Date    Arrhythmia     PVCs on holter    Auto immune neutropenia (HCC)     Hx of blood clots     left thigh many yrs ago    Hyperlipidemia     ITP (idiopathic thrombocytopenic purpura)     Liver disease     history of elevated liver enzymes 2013    Liver enzyme elevation     due to long standing history of migraine drug useage    Migraine     Migraine     Occipital neuralgia     occipital ablation 7/22/2013 [facility]    Osteoporosis     PONV (postoperative nausea and vomiting)     with anesthesia    Thyroid disease             Past Surgical History:   Procedure Laterality Date    BACK SURGERY  2010    removal of titanium plate cervical spine    CHOLECYSTECTOMY      COLONOSCOPY  12/28/2011    neg    [facility] CATH LAB PROCEDURE  May 2000    Dr. Ashok Terrell : Normal coronaries, EF is 66%    ENDOMETRIAL ABLATION FRACTURE SURGERY Left      femur    GASTRIC BYPASS SURGERY      JOINT REPLACEMENT  2012    total right knee replacement    JOINT REPLACEMENT  2015    total left knee    OTHER SURGICAL HISTORY      cervical Spinal cord stimulator  in [place]    OTHER SURGICAL HISTORY  2013    left greater occipital radiofreqency ablation    OTHER SURGICAL HISTORY Right 04/06/2017    Excision of cyst on ring finger    SPINAL FUSION      spinal fusion of C5 and C6 then hardware was removed    SPLENECTOMY      d/t ITP    WRIST FRACTURE SURGERY      multiple 7 right   1 on left          Family History   Problem Relation Age of Onset    High Blood Pressure Father     Arthritis Brother           Social History     Tobacco Use    Smoking status: Never    Smokeless tobacco: Never   Substance Use Topics    Alcohol use: Yes     Comment: socially         Review of Systems:    Constitutional: negative for fever and chills, or significant weight loss  HEENT: negative for acute visual symptoms or auditory problems, no dysphagia  Respiratory: negative for cough, wheezing, or hemoptysis  Cardiovascular: +ve for chest pain and dyspnea  Gastrointestinal: negative for abdominal pain, diarrhea, melena, nausea and vomiting  Endocrine: Negative for polyuria and polydyspsia  Genitourinary: negative for dysuria and hematuria  Derm: negative for rash and skin lesion(s)  Neurological: negative for tingling, numbness, weakness, seizures  Endocrine: negative for polydipsia and polyuria  Musculoskeletal: negative for pain or tenderness  Psychiatric: negative for anxiety, depression, or suicidal ideations         O:  COMPLETE PHYSICAL EXAM:       /78   Pulse 63   Resp 18   Ht 5' 3\" (1.6 m)   Wt 224 lb 12.8 oz (102 kg)   BMI 39.82 kg/m²       General:   Patient alert, comfortable, no distress. Appears stated age. HEENT:    Pupils equal, no icterus; Tongue moist.   Neck:              No masses, Thyroid not palpable.  No elevated JVD, No carotid bruit.   Chest:   Normal configuration, non tender. Lungs:   Clear to auscultation bilaterally except few scattered rhonchi. Cardiovascular:  Regular rhythm, 2/6 systolic murmur, No S3, no palpable thrills. Abdomen:  Soft, Bowel sounds normal, no pulsatile abdominal aorta, no palpable masses. Extremities:  No edema. Distal pulses palpable. No cyanosis, no clubbing. Skin:   Good turgor, warm and dry, no cyanosis. Musculoskeletal: No joint swelling or deformity. Neuro:   Cranial nerves grossly intact; No focal neurologic deficit. Psych:   Alert, good mood and effect. REVIEW OF DIAGNOSTIC TESTS:        Electrocardiogram: Reviewed      Labs:  Reviewed        Echo 8/29/2013 - EF 69%, DD1    Stress test; 8/29/2013  - No ischemia, EF 60%     Holter - 6/2013 Dr Emerson Lantigua    INTERPRETATION:   1. Rhythm:  Sinus rhythm with a heart rate ranging from 51 to 102 bpm with      an average heart rate of 70.   2.    Ventricular ectopy:  Frequent PVCs are noted with frequent amount of      couplets and ventricular bigeminy. PVCs ranged from 0 to 1400 PVCs an      hour. There were no runs of V tach. There was very little ventricular      ectopy noted from 11 p.m. until 5 a.m. where the PVCs ranged from 0 to 55      an hour. After that, the PVCs seemed to range from 500 to 1400 an hour. 3.    Atrial ectopy: There was an occasional PAC with occasional atrial      couplet with PACs ranging from 3 to 8 an hour with no runs of PAT. 4.    ST-T changes: Nonspecific ST-T changes were noted. 5.    Diary:  The patient had 1 episode noted in the diary, complaining of      dizziness with the  Holter showing the patient having an episode of      ventricular couplets with some baseline artifact. ASSESSMENT / PLAN:    Vivien Olivas was seen today for new patient. Diagnoses and all orders for this visit:    Abnormal EKG  -     EKG 12 Lead  -     Cardiac Stress Test - w/Pharm;  Future    SOBOE (shortness of breath on exertion) - No acute CHF  -     Echo 2D w doppler w color complete; Future  -     NM Cardiac Stress Test Nuclear Imaging; Future  -     Cardiac Stress Test - w/Pharm; Future    Other chest pain - Atypical  -     Echo 2D w doppler w color complete; Future  -     NM Cardiac Stress Test Nuclear Imaging; Future  -     Cardiac Stress Test - w/Pharm; Future    Dyslipidemia -  On Statin  -     Echo 2D w doppler w color complete; Future    History of ITP    Hx of gastric bypass    Acquired hypothyroidism - On HRT    Chronic pain of left knee    BMI 39.0-39.9,adult - Diet, exercise and weight loss discussed. Heart murmur  -     Echo 2D w doppler w color complete; Future    History of COVID-19 in July 2022, Dec 2022     Rheumatoid arthritis     PVC's (premature ventricular contractions)     Occipital neuralgia - s/p Stimulator implant     Preventive Cardiology: Low cholesterol diet, regular exercise as tolerate, and gradual weight loss discussed. Other orders  -     regadenoson (LEXISCAN) injection 0.4 mg  -     Beta Blocker Management Prior to Cardiac Stress Test; Standing  -     Beta Blocker Management Prior to Cardiac Stress Test  -     nitroGLYCERIN (NITROSTAT) 0.4 MG SL tablet; Place 1 tablet under the tongue every 5 minutes as needed for Chest pain     Above recommendations discussed with her and her    The patient's current medication list, allergies, problem list and results of prior tests (as available) were reviewed at today's visit   All questions answered about cardiac diagnoses and cardiac medications. Continue current medications. Monitor BP and heart rates. Compliance with medications and f/u with all physicians discussed. Risk factor modification based on risk profile discussed. Call if any exertional chest pain, short of breath, dizzy or palpitations. Follow up in 4-6 weeks or earlier if needed.          [facility]  [address], [place] [address] [phone]

## 2023-02-14 ENCOUNTER — TELEPHONE (OUTPATIENT)
Dept: CARDIOLOGY | Age: 63
End: 2023-02-14

## 2023-02-14 NOTE — TELEPHONE ENCOUNTER
Spoke w/ pt to confirm stress test  and echo for Thursday, 2/16/22 starting at 0700. Instructions given and medications reviewed. Pt advised no medication holds. All questions answered.

## 2023-02-16 ENCOUNTER — HOSPITAL ENCOUNTER (OUTPATIENT)
Dept: CARDIOLOGY | Age: 63
Discharge: HOME | End: 2023-02-16
Payer: COMMERCIAL

## 2023-02-16 VITALS
BODY MASS INDEX: 39.87 KG/M2 | RESPIRATION RATE: 16 BRPM | WEIGHT: 225 LBS | SYSTOLIC BLOOD PRESSURE: 137 MMHG | HEART RATE: 58 BPM | DIASTOLIC BLOOD PRESSURE: 87 MMHG | HEIGHT: 63 IN

## 2023-02-16 DIAGNOSIS — R07.89 OTHER CHEST PAIN: ICD-10-CM

## 2023-02-16 DIAGNOSIS — R06.02 SOBOE (SHORTNESS OF BREATH ON EXERTION): ICD-10-CM

## 2023-02-16 DIAGNOSIS — E78.5 DYSLIPIDEMIA: ICD-10-CM

## 2023-02-16 DIAGNOSIS — R01.1 HEART MURMUR: ICD-10-CM

## 2023-02-16 DIAGNOSIS — R94.31 ABNORMAL EKG: ICD-10-CM

## 2023-02-16 PROCEDURE — 2580000003 HC RX 258: Performed by: INTERNAL MEDICINE

## 2023-02-16 PROCEDURE — A9502 TC99M TETROFOSMIN: HCPCS | Performed by: INTERNAL MEDICINE

## 2023-02-16 PROCEDURE — 78452 HT MUSCLE IMAGE SPECT MULT: CPT

## 2023-02-16 PROCEDURE — 93017 CV STRESS TEST TRACING ONLY: CPT

## 2023-02-16 PROCEDURE — 6360000002 HC RX W HCPCS: Performed by: INTERNAL MEDICINE

## 2023-02-16 PROCEDURE — 93306 TTE W/DOPPLER COMPLETE: CPT

## 2023-02-16 PROCEDURE — 3430000000 HC RX DIAGNOSTIC RADIOPHARMACEUTICAL: Performed by: INTERNAL MEDICINE

## 2023-02-16 RX ORDER — SODIUM CHLORIDE 0.9 % (FLUSH) 0.9 %
10 SYRINGE (ML) INJECTION PRN
Status: DISCONTINUED | OUTPATIENT
Start: 2023-02-16 | End: 2023-02-17 | Stop reason: HOSPADM

## 2023-02-16 RX ORDER — SULFAMETHOXAZOLE AND TRIMETHOPRIM 800; 160 MG/1; MG/1
1 TABLET ORAL 2 TIMES DAILY
COMMUNITY
Start: 2023-02-14

## 2023-02-16 RX ORDER — CELECOXIB 50 MG/1
50 CAPSULE ORAL EVERY 6 HOURS PRN
COMMUNITY
Start: 2023-02-10

## 2023-02-16 RX ADMIN — SODIUM CHLORIDE, PRESERVATIVE FREE 10 ML: 5 INJECTION INTRAVENOUS at 07:48

## 2023-02-16 RX ADMIN — TETROFOSMIN 33 MILLICURIE: 0.23 INJECTION, POWDER, LYOPHILIZED, FOR SOLUTION INTRAVENOUS at 09:08

## 2023-02-16 RX ADMIN — SODIUM CHLORIDE, PRESERVATIVE FREE 10 ML: 5 INJECTION INTRAVENOUS at 09:08

## 2023-02-16 RX ADMIN — REGADENOSON 0.4 MG: 0.08 INJECTION, SOLUTION INTRAVENOUS at 09:08

## 2023-02-16 RX ADMIN — TETROFOSMIN 10.5 MILLICURIE: 0.23 INJECTION, POWDER, LYOPHILIZED, FOR SOLUTION INTRAVENOUS at 07:48

## 2023-02-16 RX ADMIN — SODIUM CHLORIDE, PRESERVATIVE FREE 10 ML: 5 INJECTION INTRAVENOUS at 09:09

## 2023-02-16 NOTE — DISCHARGE INSTRUCTIONS
[address] [facility]      Instructions to Patients    The following are the instructions for patients who have had a procedure in our office today. Patient name: Laly Chairez    Radionuclide Activity: 40mCi of 99mTc-Tetrofosmin    Date Administered: 2/16/2023    Expires: 48 hours after scheduled appointment time      Patient may resume normal activity unless otherwise instructed. Patient may resume medications as normal.  If the need should arise, patient may call [phone] between the hours of 7:00am-3:00pm.  After hours there is at least one physician on-call at all times for those patients needing assistance. Patients may call [phone] and the answering service will direct the patient to one of our physicians for assistance. After the patient's test if they are going to be leaving from an airport in the near future they should take this letter with them to verify the test and radionuclide used for their test.      This letter verifies that the above named bearer received an injection of a radionuclide for medical purpose/usage only.         Electronically signed by Nancy Manzano on 2/16/2023 at 8:35 AM

## 2023-02-16 NOTE — PROCEDURES
[address] [facility]. Mayito Babb [address] [place] [address]  [phone]                 Pharmacologic Stress Nuclear Gated SPECT Study    Name: [address] [place] [address] Account Number: [de-identified]    :  1960          Sex: female         Date of Study:  2023    Height: 5' 3\" (160 cm)         Weight: 225 lb (102.1 kg)     Ordering Provider: Anjelica Brooks MD          PCP: Castillo Bunch DO      Cardiologist: Hilario Gibson MD             Interpreting Physician: Ayo Cantrell MD  _________________________________________________________________________________    Indication:   Detecting the presence and location of coronary artery disease    Clinical History:   Patient has no known history of coronary artery disease. Resting ECG:    Sinus rhythm, poor R wave progression    Procedure:   Pharmacologic stress testing was performed with regadenoson 0.4 mg for 15 seconds. The heart rate was 58 at baseline and daljit to 84 beats during the infusion. The blood pressure at baseline was 137/87 and blood pressure at the end of infusion was 138/71. Blood pressure response was normal during the stress procedure. The patient tolerated the infusion well. ECG during the infusion did not change. IMAGING: Myocardial perfusion imaging was performed at rest 30-35 minutes following the intravenous injection of 10.5 mCi of (Tc-tetrofosmin) followed by 10 ml of Normal Saline. As per infusion protocol, the patient was injected intravenously with 33.0 mCi of (Tc-tetrofosmin) followed by 10 ml of Normal Saline. Gated post-stress tomographic imaging was performed 45 minutes after stress. FINDINGS: The overall quality of the study was good. Left ventricular cavity size was noted to be normal.    Rotational analog analysis demonstrated no patient motion or abnormal extracardiac radioactivity.     The gated SPECT stress imaging in the short, vertical long, and horizontal long axis demonstrated normal homogeneous tracer distribution throughout the myocardium both on post regadenoson and resting images. Gated SPECT left ventricular ejection fraction was calculated to be 66 %, with normal myocardial thickening and wall motion. Impression:    Electrocardiographically normal regadenoson infusion with a clinically  non-ischemic response  Myocardial perfusion imaging was normal.    Overall left ventricular systolic function was normal without regional wall motion abnormalities. 4. Low risk general pharmacologic stress test.    Thank you for sending your patient to this [employer].      Electronically signed by Rashawn Driver MD on 2/16/23 at 8:12 PM EST

## 2023-03-21 ENCOUNTER — OFFICE VISIT (OUTPATIENT)
Dept: CARDIOLOGY CLINIC | Age: 63
End: 2023-03-21
Payer: COMMERCIAL

## 2023-03-21 VITALS
WEIGHT: 229 LBS | DIASTOLIC BLOOD PRESSURE: 80 MMHG | SYSTOLIC BLOOD PRESSURE: 126 MMHG | BODY MASS INDEX: 40.57 KG/M2 | HEART RATE: 61 BPM | RESPIRATION RATE: 16 BRPM | HEIGHT: 63 IN

## 2023-03-21 DIAGNOSIS — E03.9 ACQUIRED HYPOTHYROIDISM: ICD-10-CM

## 2023-03-21 DIAGNOSIS — Z86.2 HISTORY OF ITP: ICD-10-CM

## 2023-03-21 DIAGNOSIS — E78.5 DYSLIPIDEMIA: ICD-10-CM

## 2023-03-21 DIAGNOSIS — M54.81 OCCIPITAL NEURALGIA, UNSPECIFIED LATERALITY: ICD-10-CM

## 2023-03-21 DIAGNOSIS — R06.02 SOBOE (SHORTNESS OF BREATH ON EXERTION): Primary | ICD-10-CM

## 2023-03-21 PROCEDURE — G8484 FLU IMMUNIZE NO ADMIN: HCPCS | Performed by: INTERNAL MEDICINE

## 2023-03-21 PROCEDURE — 99213 OFFICE O/P EST LOW 20 MIN: CPT | Performed by: INTERNAL MEDICINE

## 2023-03-21 PROCEDURE — G8417 CALC BMI ABV UP PARAM F/U: HCPCS | Performed by: INTERNAL MEDICINE

## 2023-03-21 PROCEDURE — G8427 DOCREV CUR MEDS BY ELIG CLIN: HCPCS | Performed by: INTERNAL MEDICINE

## 2023-03-21 PROCEDURE — 93000 ELECTROCARDIOGRAM COMPLETE: CPT | Performed by: INTERNAL MEDICINE

## 2023-03-21 PROCEDURE — 3017F COLORECTAL CA SCREEN DOC REV: CPT | Performed by: INTERNAL MEDICINE

## 2023-03-21 PROCEDURE — 1036F TOBACCO NON-USER: CPT | Performed by: INTERNAL MEDICINE

## 2023-03-21 NOTE — PROGRESS NOTES
questions answered about cardiac diagnoses and cardiac medications. Continue current medications. Monitor BP and heart rates. Compliance with medications and f/u with all physicians discussed. Risk factor modification based on risk profile discussed. Call if any exertional chest pain, short of breath, dizzy or palpitations. Follow up in 12 months or earlier if needed.          [id number] [facility]  [address] [facility] [address] [place] je[place], [facility]  [phone]

## 2023-04-06 ENCOUNTER — HOSPITAL ENCOUNTER (OUTPATIENT)
Dept: CT IMAGING | Age: 63
Discharge: HOME | End: 2023-04-06
Payer: COMMERCIAL

## 2023-04-06 DIAGNOSIS — R91.8 LUNG NODULES: ICD-10-CM

## 2023-04-06 PROCEDURE — 71250 CT THORAX DX C-: CPT

## 2023-06-26 ENCOUNTER — HOSPITAL ENCOUNTER (EMERGENCY)
Age: 63
Discharge: HOME | End: 2023-06-26
Payer: COMMERCIAL

## 2023-06-26 VITALS
SYSTOLIC BLOOD PRESSURE: 141 MMHG | HEART RATE: 69 BPM | DIASTOLIC BLOOD PRESSURE: 50 MMHG | HEIGHT: 63 IN | WEIGHT: 225 LBS | TEMPERATURE: 98.7 F | RESPIRATION RATE: 18 BRPM | BODY MASS INDEX: 39.87 KG/M2 | OXYGEN SATURATION: 100 %

## 2023-06-26 DIAGNOSIS — H60.501 ACUTE OTITIS EXTERNA OF RIGHT EAR, UNSPECIFIED TYPE: ICD-10-CM

## 2023-06-26 DIAGNOSIS — J01.90 ACUTE SINUSITIS, RECURRENCE NOT SPECIFIED, UNSPECIFIED LOCATION: Primary | ICD-10-CM

## 2023-06-26 PROCEDURE — 99211 OFF/OP EST MAY X REQ PHY/QHP: CPT

## 2023-06-26 RX ORDER — PREDNISONE 10 MG/1
TABLET ORAL
Qty: 14 TABLET | Refills: 0 | Status: SHIPPED | OUTPATIENT
Start: 2023-06-26

## 2023-06-26 RX ORDER — CIPROFLOXACIN AND DEXAMETHASONE 3; 1 MG/ML; MG/ML
4 SUSPENSION/ DROPS AURICULAR (OTIC) 2 TIMES DAILY
Qty: 7.5 ML | Refills: 0 | Status: SHIPPED | OUTPATIENT
Start: 2023-06-26 | End: 2023-07-03

## 2023-06-26 ASSESSMENT — PAIN DESCRIPTION - ORIENTATION: ORIENTATION: RIGHT

## 2023-06-26 ASSESSMENT — VISUAL ACUITY: OU: 1

## 2023-06-26 ASSESSMENT — PAIN - FUNCTIONAL ASSESSMENT: PAIN_FUNCTIONAL_ASSESSMENT: 0-10

## 2023-06-26 ASSESSMENT — PAIN SCALES - GENERAL: PAINLEVEL_OUTOF10: 6

## 2023-06-26 ASSESSMENT — PAIN DESCRIPTION - DESCRIPTORS: DESCRIPTORS: ACHING;DISCOMFORT

## 2023-06-26 ASSESSMENT — PAIN DESCRIPTION - LOCATION: LOCATION: EAR

## 2023-07-27 ENCOUNTER — HOSPITAL ENCOUNTER (OUTPATIENT)
Dept: GENERAL RADIOLOGY | Age: 63
Discharge: HOME | End: 2023-07-29
Payer: COMMERCIAL

## 2023-07-27 ENCOUNTER — HOSPITAL ENCOUNTER (OUTPATIENT)
Age: 63
Discharge: HOME | End: 2023-07-29
Payer: COMMERCIAL

## 2023-07-27 DIAGNOSIS — M25.511 RIGHT SHOULDER PAIN, UNSPECIFIED CHRONICITY: ICD-10-CM

## 2023-07-27 DIAGNOSIS — M54.2 CERVICALGIA: ICD-10-CM

## 2023-07-27 DIAGNOSIS — M26.69 TEMPOROMANDIBULAR JOINT SOUNDS ON OPENING AND/OR CLOSING THE JAW: ICD-10-CM

## 2023-07-27 PROCEDURE — 73030 X-RAY EXAM OF SHOULDER: CPT

## 2023-07-27 PROCEDURE — 70330 X-RAY EXAM OF JAW JOINTS: CPT

## 2023-07-27 PROCEDURE — 72052 X-RAY EXAM NECK SPINE 6/>VWS: CPT

## 2023-08-16 ENCOUNTER — EVALUATION (OUTPATIENT)
Dept: PHYSICAL THERAPY | Age: 63
End: 2023-08-16

## 2023-08-16 DIAGNOSIS — M54.2 CERVICALGIA: ICD-10-CM

## 2023-08-16 DIAGNOSIS — Z98.1 HISTORY OF SPINAL FUSION: ICD-10-CM

## 2023-08-16 DIAGNOSIS — M54.12 CERVICAL RADICULOPATHY: Primary | ICD-10-CM

## 2023-08-16 NOTE — PROGRESS NOTES
[facility] OUTPATIENT REHABILITATION  PHYSICAL THERAPY INITIAL EVALUATION         Date:  2023   Patient: Cailin Mcgowan  : 1960  MRN: [id number]  Referring Provider: Neida Jaimes DO  [id number]     Medical Diagnosis:      Diagnosis Orders   1. Cervical radiculopathy        2. History of spinal fusion        3. Cervicalgia            Physician Order: Brent Crowell and Treat      SUBJECTIVE:     Onset date: 3 years ago    Onset: Insidious      History / Mechanism of Injury: Reports progressively worsening R arm pain over the last 3 years. She reports the pain travels down posterior arm to palm of hand and includes 2nd-5th digits. Also reports paresthesia in this same distribution. Denies arm weakness. Denies neck pain. She does report her arm and hand went numb when turning palm up while getting shoulder x-ray. Interventions for current problem: diclofenac     Chief complaint: pain, poor sleep quality , pain when leaning forward, unable to lie on L side    Behavior: condition is getting worse    Pain:   Current: 3/10     Best: 0/10     Worst:6/10   Pain frequency: intermittent    Symptom Type / Quality: aching to sharp  Location[de-identified] Neck: noted above         Provoking Activities/Positions: lying on left side, leaning forward with weight on elbow                 Relieving Activitie/Positions:   light movement     Disturbed Sleep: yes    Imaging results: XR TMJ BILATERAL    Result Date: 2023  EXAMINATION: FIVE XRAY VIEWS OF THE TEMPOROMANDIBULAR JOINTS INCLUDING CLOSED AND OPEN MOUTH VIEWS 2023 3:48 pm COMPARISON: None. HISTORY: ORDERING SYSTEM PROVIDED HISTORY: Temporomandibular joint sounds on opening and/or closing the jaw TECHNOLOGIST PROVIDED HISTORY: Reason for exam:->Temporomandibular joint sounds on opening and/or closing the jaw FINDINGS: Close mouth views are unremarkable.   On the open mouth views, there appears to be good movement of the
PT    Treatment Charges: Mins Units   Initial Evaluation 45 1   Re-Evaluation     Ther Exercise         TE     Manual Therapy     MT     Ther Activities        TA     Gait Training          GT     Neuro Re-education NR     Modalities     Non-Billable Service Time     Other     Total Time/Units 45 1

## 2023-08-22 ENCOUNTER — TREATMENT (OUTPATIENT)
Dept: PHYSICAL THERAPY | Age: 63
End: 2023-08-22
Payer: COMMERCIAL

## 2023-08-22 DIAGNOSIS — Z98.1 HISTORY OF SPINAL FUSION: ICD-10-CM

## 2023-08-22 DIAGNOSIS — M54.12 CERVICAL RADICULOPATHY: Primary | ICD-10-CM

## 2023-08-22 DIAGNOSIS — M54.2 CERVICALGIA: ICD-10-CM

## 2023-08-22 PROCEDURE — 97110 THERAPEUTIC EXERCISES: CPT | Performed by: PHYSICAL THERAPIST

## 2023-08-22 NOTE — PROGRESS NOTES
Physical Therapy Treatment Note    Date: 2023  Patient Name: Cheyenne Shannon  : 1960   MRN: [id number]  [place]ather[place]: 3 years; worsening   DOSx: --  Referring Provider: Bean Rubio DO  [phone] Diagnosis:      Diagnosis Orders   1. Cervical radiculopathy        2. History of spinal fusion        3. Willim Nish presents with arm pain without neck pain that is sensitive to compression and tension. Treatment will focus on reducing spinal compression and tension via movement (stretching, AROM, posture) and then progressing into strengthening to build tissue resiliency. An active program is preferred to build tissue resiliency, however we may use heat, electrical stimulation for symptom modification as appropriate. Access Code: Francis Peres  URL: [email or web address]  Date: 2023  Prepared by: Nazia Mcneil    Exercises  - Standing Cervical Retraction  - 2 x daily - 2 sets - 5 reps  - Staggered Stance Single Arm Row with Anchored Resistance  - 2-3 x weekly - 3 sets - 10-15 reps  - Split Stance Shoulder Row with Resistance  - 2-3 x weekly - 3 sets - 10-15 reps      X = TO BE PERFORMED NEXT VISIT  > = PROGRESS TO THIS    S: Neck is feeling good today. Reports tht further thought into her hand numbness reveals that it begins in the 5th digit and then spreads into the other fingers. O:   Time [id number]     Visit -12 Repeat outcome measure at mid point and end. Pain Pain 0-6/10     ROM      Modalities      MH / Ice + ES X  MO         Manual      Cervical soft tissue mobilization  ? MT   ROM      Supine self PROM -   Cervical Rotation  ? NR   Supine self PROM -   Cervical side bending  ?   NR   Cervical flexion flossing HEP     Chin tucks 2 x 5  NR   Exercise      Supine neck flexion    NR   Neck retraction  Quadruped vs seated leaning over   NR   Cervical lateral flexion isometrics    NR   Cervical extension isometrics    NR

## 2023-08-24 ENCOUNTER — TREATMENT (OUTPATIENT)
Dept: PHYSICAL THERAPY | Age: 63
End: 2023-08-24

## 2023-08-24 DIAGNOSIS — Z98.1 HISTORY OF SPINAL FUSION: ICD-10-CM

## 2023-08-24 DIAGNOSIS — M54.2 CERVICALGIA: ICD-10-CM

## 2023-08-24 DIAGNOSIS — M54.12 CERVICAL RADICULOPATHY: Primary | ICD-10-CM

## 2023-08-24 NOTE — PROGRESS NOTES
Physical Therapy Treatment Note    Date: 2023  Patient Name: Brigida Dinero  : 1960   MRN: [id number]  [place]ather[place]: 3 years; worsening   DOSx: --  Referring Provider: Jonas Potts DO  [phone] Diagnosis:      Diagnosis Orders   1. Cervical radiculopathy        2. History of spinal fusion        3. Jonathan Neighbor presents with arm pain without neck pain that is sensitive to compression and tension. Treatment will focus on reducing spinal compression and tension via movement (stretching, AROM, posture) and then progressing into strengthening to build tissue resiliency. An active program is preferred to build tissue resiliency, however we may use heat, electrical stimulation for symptom modification as appropriate. Access Code: Zada Parents  URL: [email or web address]  Date: 2023  Prepared by: Ochoa Martinez    Exercises  - Standing Cervical Retraction  - 2 x daily - 2 sets - 5 reps  - Staggered Stance Single Arm Row with Anchored Resistance  - 2-3 x weekly - 3 sets - 10-15 reps  - Split Stance Shoulder Row with Resistance  - 2-3 x weekly - 3 sets - 10-15 reps  - Standing Floor Level Row  - 2-3 x weekly - 3 sets - 10-15 reps  - Shoulder External Rotation with Anchored Resistance  - 2-3 x weekly - 3 sets - 6-10 reps      X = TO BE PERFORMED NEXT VISIT  > = PROGRESS TO THIS    S: Neck is feeling good today. Chin tucks gave her a headache; stopped dong them; may re-try with less effort. O:   Time 5073-4518     Visit 3/6- Repeat outcome measure at mid point and end. Pain Pain 0-6/10     ROM      Modalities      MH / Ice + ES X? MO         Manual      Cervical soft tissue mobilization  ? MT   ROM      Supine self PROM -   Cervical Rotation  ? NR   Supine self PROM -   Cervical side bending  ?   NR   Cervical flexion flossing HEP     Chin tucks 2 x 5  NR   Exercise      Supine neck flexion    NR   Neck retraction  Quadruped vs seated

## 2023-09-06 ENCOUNTER — TREATMENT (OUTPATIENT)
Dept: PHYSICAL THERAPY | Age: 63
End: 2023-09-06
Payer: COMMERCIAL

## 2023-09-06 DIAGNOSIS — M54.12 CERVICAL RADICULOPATHY: Primary | ICD-10-CM

## 2023-09-06 DIAGNOSIS — M54.2 CERVICALGIA: ICD-10-CM

## 2023-09-06 DIAGNOSIS — Z98.1 HISTORY OF SPINAL FUSION: ICD-10-CM

## 2023-09-06 PROCEDURE — 97110 THERAPEUTIC EXERCISES: CPT | Performed by: PHYSICAL THERAPIST

## 2023-09-06 NOTE — PROGRESS NOTES
Physical Therapy Treatment Note    Date: 2023  Patient Name: Deandra Marks  : 1960   MRN: [id number]  [place]: 3 years; worsening   DOSx: --  Referring Provider: Ethel Hashimoto, DO  [phone] Diagnosis:      Diagnosis Orders   1. Cervical radiculopathy        2. History of spinal fusion        3. Lamonte Howard presents with arm pain without neck pain that is sensitive to compression and tension. Treatment will focus on reducing spinal compression and tension via movement (stretching, AROM, posture) and then progressing into strengthening to build tissue resiliency. An active program is preferred to build tissue resiliency, however we may use heat, electrical stimulation for symptom modification as appropriate. Access Code: Nikita Tobar  URL: [email or web address]  Date: 2023  Prepared by: Montse Porras    Exercises  - Standing Cervical Retraction  - 2 x daily - 2 sets - 5 reps  - Staggered Stance Single Arm Row with Anchored Resistance  - 2-3 x weekly - 2-3 sets - 10-15 reps  - Split Stance Shoulder Row with Resistance  - 2-3 x weekly - 2-3 sets - 10-15 reps  - Standing Floor Level Row  - 2-3 x weekly - 2-3 sets - 10-15 reps  - Shoulder External Rotation with Anchored Resistance  - 2-3 x weekly - 2-3 sets - 6-10 reps  - Standing Shoulder Shrugs with Dumbbells  - 2-3 x weekly - 3 sets - 10-15 reps  - Standing Shoulder Flexion to 90 Degrees with Dumbbells  - 2-3 x weekly - 3 sets - 10-15 reps      X = TO BE PERFORMED NEXT VISIT  > = PROGRESS TO THIS    S: Neck and R UE symptoms wax and wane. She continues to have intermittent paresthesia L arm into posterior forearm and digits 2-5. Exercise is going well. O: Cervical compression quadrant testing and Spurling test negative. Phalen's, Reverse Phalen's, Tinel's tap at carpal tunnel and cubital tunnel negative.     Time 2604-2154     Visit  Repeat outcome measure at mid point

## 2023-09-06 NOTE — PROGRESS NOTES
[facility] OUTPATIENT REHABILITATION   PHYSICAL THERAPY DISCHARGE REPORT      Date:  2023   Patient: Terence Anna  : 1960  MRN: [id number]  Referring Provider: Mame Hong DO  [id number] [address] Diagnosis:      Diagnosis Orders   1. Cervical radiculopathy        2. History of spinal fusion        3. Cervicalgia            ATTENDANCE:  Patient has attended 4 of 4 scheduled treatments from 23  to 23. Cancellations: 0. No shows: 0.  TREATMENTS RECEIVED:  AROM, therapeutic exercise, strengthening, HEP    INITIAL STATUS:  Pain 6/10 intermittent  Strength decreased   Limitations with sleep quality, prolonged positions, positions that cause compression    FINAL STATUS:   Pain 6/10 intermittent  Strength decreased   Limitations with sleep quality, prolonged positions, positions that cause compression    GOALS:  Long Term Goals for independence in home exercise program obtained. Goals to improve symptoms not met. PATIENT GOALS: pain relief    PATIENT EDUCATION / INSTRUCTIONS: Written HEP and exercise bands. REASON FOR DISCHARGE and RECOMMENDATIONS: Discussed current status -- noted that I have been unable to reproduce her neurological symptoms. She has symptoms in the arm in both median and ulnar nerve suggesting cervical radiculopathy. I added that we cannot use decompression techniques such as traction due to history of fusion. I also added that I have shown her all the exercise I would have a person do for this condition and she does not need to return. She is to continue to perform her HEP long-term. I added that improvement for these type of problems is measured in months, not weeks. Also suggested she discuss her neck and arm problems with Dr. Donnell Baird since she has a relationship with him (had lumbar RFA yesterday). Finally, I recommended she call with questions / concerns or if she needs new exercise bands.      Thank

## 2023-09-22 ENCOUNTER — HOSPITAL ENCOUNTER (OUTPATIENT)
Dept: GENERAL RADIOLOGY | Age: 63
End: 2023-09-22
Payer: COMMERCIAL

## 2023-09-22 ENCOUNTER — HOSPITAL ENCOUNTER (OUTPATIENT)
Age: 63
Discharge: HOME | End: 2023-09-22
Payer: COMMERCIAL

## 2023-09-22 DIAGNOSIS — J20.9 ACUTE BRONCHITIS, UNSPECIFIED ORGANISM: ICD-10-CM

## 2023-09-22 PROCEDURE — 71046 X-RAY EXAM CHEST 2 VIEWS: CPT

## 2023-11-17 ENCOUNTER — TELEPHONE (OUTPATIENT)
Dept: SLEEP CENTER | Age: 63
End: 2023-11-17

## 2023-11-29 ENCOUNTER — TELEPHONE (OUTPATIENT)
Dept: SLEEP CENTER | Age: 63
End: 2023-11-29

## 2023-11-30 ENCOUNTER — HOSPITAL ENCOUNTER (OUTPATIENT)
Dept: SLEEP CENTER | Age: 63
Discharge: HOME | End: 2023-11-30

## 2023-11-30 DIAGNOSIS — G47.33 OSA (OBSTRUCTIVE SLEEP APNEA): Primary | ICD-10-CM

## 2023-12-01 ENCOUNTER — HOSPITAL ENCOUNTER (OUTPATIENT)
Dept: CT IMAGING | Age: 63
Discharge: HOME | End: 2023-12-01
Payer: COMMERCIAL

## 2023-12-01 DIAGNOSIS — M51.36 DEGENERATION OF LUMBAR INTERVERTEBRAL DISC: ICD-10-CM

## 2023-12-01 PROCEDURE — 72131 CT LUMBAR SPINE W/O DYE: CPT

## 2023-12-07 ENCOUNTER — APPOINTMENT (OUTPATIENT)
Dept: GENERAL RADIOLOGY | Age: 63
End: 2023-12-07
Payer: COMMERCIAL

## 2023-12-07 ENCOUNTER — HOSPITAL ENCOUNTER (EMERGENCY)
Age: 63
Discharge: HOME | End: 2023-12-07
Attending: STUDENT IN AN ORGANIZED HEALTH CARE EDUCATION/TRAINING PROGRAM
Payer: COMMERCIAL

## 2023-12-07 ENCOUNTER — APPOINTMENT (OUTPATIENT)
Dept: CT IMAGING | Age: 63
End: 2023-12-07
Payer: COMMERCIAL

## 2023-12-07 VITALS
WEIGHT: 225 LBS | HEIGHT: 63 IN | OXYGEN SATURATION: 96 % | RESPIRATION RATE: 16 BRPM | HEART RATE: 60 BPM | DIASTOLIC BLOOD PRESSURE: 78 MMHG | TEMPERATURE: 98.5 F | BODY MASS INDEX: 39.87 KG/M2 | SYSTOLIC BLOOD PRESSURE: 126 MMHG

## 2023-12-07 DIAGNOSIS — M25.511 CHRONIC RIGHT SHOULDER PAIN: ICD-10-CM

## 2023-12-07 DIAGNOSIS — G89.29 CHRONIC RIGHT SHOULDER PAIN: ICD-10-CM

## 2023-12-07 DIAGNOSIS — M54.12 CERVICAL RADICULOPATHY: Primary | ICD-10-CM

## 2023-12-07 PROCEDURE — 71100 X-RAY EXAM RIBS UNI 2 VIEWS: CPT

## 2023-12-07 PROCEDURE — 99284 EMERGENCY DEPT VISIT MOD MDM: CPT

## 2023-12-07 PROCEDURE — 73030 X-RAY EXAM OF SHOULDER: CPT

## 2023-12-07 PROCEDURE — 72125 CT NECK SPINE W/O DYE: CPT

## 2023-12-07 PROCEDURE — 6370000000 HC RX 637 (ALT 250 FOR IP)

## 2023-12-07 RX ORDER — HYDROCODONE BITARTRATE AND ACETAMINOPHEN 5; 325 MG/1; MG/1
1 TABLET ORAL EVERY 6 HOURS PRN
Qty: 8 TABLET | Refills: 0 | Status: SHIPPED | OUTPATIENT
Start: 2023-12-07 | End: 2023-12-07 | Stop reason: SDUPTHER

## 2023-12-07 RX ORDER — HYDROCODONE BITARTRATE AND ACETAMINOPHEN 5; 325 MG/1; MG/1
1 TABLET ORAL ONCE
Status: COMPLETED | OUTPATIENT
Start: 2023-12-07 | End: 2023-12-07

## 2023-12-07 RX ORDER — CYCLOBENZAPRINE HCL 5 MG
5 TABLET ORAL 2 TIMES DAILY PRN
Qty: 10 TABLET | Refills: 0 | Status: SHIPPED | OUTPATIENT
Start: 2023-12-07 | End: 2023-12-17

## 2023-12-07 RX ORDER — LIDOCAINE 50 MG/G
1 PATCH TOPICAL DAILY
Qty: 10 PATCH | Refills: 0 | Status: SHIPPED | OUTPATIENT
Start: 2023-12-07 | End: 2023-12-17

## 2023-12-07 RX ORDER — CYCLOBENZAPRINE HCL 5 MG
5 TABLET ORAL 2 TIMES DAILY PRN
Qty: 10 TABLET | Refills: 0 | Status: SHIPPED | OUTPATIENT
Start: 2023-12-07 | End: 2023-12-07 | Stop reason: SDUPTHER

## 2023-12-07 RX ORDER — LIDOCAINE 50 MG/G
1 PATCH TOPICAL DAILY
Qty: 10 PATCH | Refills: 0 | Status: SHIPPED | OUTPATIENT
Start: 2023-12-07 | End: 2023-12-07 | Stop reason: SDUPTHER

## 2023-12-07 RX ORDER — HYDROCODONE BITARTRATE AND ACETAMINOPHEN 5; 325 MG/1; MG/1
1 TABLET ORAL EVERY 6 HOURS PRN
Qty: 8 TABLET | Refills: 0 | Status: SHIPPED | OUTPATIENT
Start: 2023-12-07 | End: 2023-12-09

## 2023-12-07 RX ADMIN — HYDROCODONE BITARTRATE AND ACETAMINOPHEN 1 TABLET: 5; 325 TABLET ORAL at 02:33

## 2023-12-07 ASSESSMENT — PAIN DESCRIPTION - ORIENTATION
ORIENTATION: RIGHT
ORIENTATION: RIGHT

## 2023-12-07 ASSESSMENT — PAIN DESCRIPTION - LOCATION
LOCATION: SHOULDER
LOCATION: SHOULDER

## 2023-12-07 ASSESSMENT — PAIN - FUNCTIONAL ASSESSMENT
PAIN_FUNCTIONAL_ASSESSMENT: 0-10
PAIN_FUNCTIONAL_ASSESSMENT: 0-10

## 2023-12-07 ASSESSMENT — PAIN SCALES - GENERAL
PAINLEVEL_OUTOF10: 8
PAINLEVEL_OUTOF10: 8
PAINLEVEL_OUTOF10: 6

## 2023-12-07 ASSESSMENT — PAIN DESCRIPTION - DESCRIPTORS
DESCRIPTORS: ACHING
DESCRIPTORS: ACHING

## 2023-12-07 NOTE — DISCHARGE INSTR - COC
Continuity of Care Form    Patient Name: Tanika Fairbanks   :  1960  MRN:  [id number]    Admit date:  2023  Discharge date:  ***    Code Status Order: Prior   Advance Directives:     Admitting Physician:  No admitting provider for patient encounter. PCP: Sharan Bass DO    Discharging Nurse: [facility] Unit/Room#:   Discharging Unit Phone Number: ***    Emergency Contact:   Extended Emergency Contact Information  Primary Emergency Contact: [facility]  Address: [address] [place] [address],[address] [place] [address] [address] Phone: [phone]  Mobile Phone: [phone]  Relation: Spouse   needed?  No    Past Surgical History:  Past Surgical History:   Procedure Laterality Date    BACK SURGERY      removal of titanium plate cervical spine    CHOLECYSTECTOMY      COLONOSCOPY  2011    neg    DIAGNOSTIC CARDIAC CATH LAB PROCEDURE  May 2000    Dr. Pedraza Sas : Normal coronaries, EF is 66%    ENDOMETRIAL ABLATION      FRACTURE SURGERY Left      femur    GASTRIC BYPASS SURGERY      JOINT REPLACEMENT      total right knee replacement    JOINT REPLACEMENT  2015    total left knee    OTHER SURGICAL HISTORY      cervical Spinal cord stimulator  in [facility]    OTHER SURGICAL HISTORY  2013    left greater occipital radiofreqency ablation    OTHER SURGICAL HISTORY Right 2017    Excision of cyst on ring finger    SPINAL FUSION      spinal fusion of C5 and C6 then hardware was removed    SPLENECTOMY      d/t ITP    WRIST FRACTURE SURGERY      multiple 7 right   1 on left       Immunization History:   Immunization History   Administered Date(s) Administered    Influenza Virus Vaccine 10/13/2022       Active Problems:  Patient Active Problem List   Diagnosis Code    Arrhythmia I49.9    Occipital neuralgia M54.81    Liver enzyme elevation R74.8    Rheumatoid arthritis ([address]) M06.9    Obesity E66.9    History of ITP Z86.2    Dyslipidemia E78.5    Murmur,

## 2023-12-07 NOTE — ED PROVIDER NOTES
Record. FINAL IMPRESSION    No diagnosis found. DISPOSITION/PLAN     DISPOSITION      DISPOSITION  Disposition: {Plan; disposition:10503}  Patient condition is {condition:55104}    12/7/23, 2:30 AM EST. Jose Ventura PGY-2  Emergency Medicine    PATIENT REFERRED TO:  No follow-up provider specified.     DISCHARGE MEDICATIONS:  New Prescriptions    No medications on file       DISCONTINUED MEDICATIONS:  Discontinued Medications    No medications on file              (Please note that portions of this note were completed with a voice recognition program.  Efforts were made to edit the dictations but occasionally words are mis-transcribed.)    Jose Ventura DO (electronically signed)

## 2023-12-08 ENCOUNTER — TELEPHONE (OUTPATIENT)
Dept: SLEEP CENTER | Age: 63
End: 2023-12-08

## 2023-12-08 NOTE — TELEPHONE ENCOUNTER
Called patient to schedule HST, patient states that she does not want it at this time and will call back when she is ready

## 2023-12-12 DIAGNOSIS — R20.0 HAND NUMBNESS: ICD-10-CM

## 2023-12-12 DIAGNOSIS — M54.2 NECK PAIN: Primary | ICD-10-CM

## 2023-12-13 ENCOUNTER — PROCEDURE VISIT (OUTPATIENT)
Dept: PHYSICAL MEDICINE AND REHAB | Age: 63
End: 2023-12-13
Payer: COMMERCIAL

## 2023-12-13 VITALS — WEIGHT: 225 LBS | HEIGHT: 63 IN | BODY MASS INDEX: 39.87 KG/M2

## 2023-12-13 DIAGNOSIS — R20.0 HAND NUMBNESS: ICD-10-CM

## 2023-12-13 DIAGNOSIS — M54.2 NECK PAIN: ICD-10-CM

## 2023-12-13 DIAGNOSIS — G56.01 RIGHT CARPAL TUNNEL SYNDROME: Primary | ICD-10-CM

## 2023-12-13 PROCEDURE — G8484 FLU IMMUNIZE NO ADMIN: HCPCS | Performed by: PHYSICAL MEDICINE & REHABILITATION

## 2023-12-13 PROCEDURE — G8417 CALC BMI ABV UP PARAM F/U: HCPCS | Performed by: PHYSICAL MEDICINE & REHABILITATION

## 2023-12-13 PROCEDURE — 99243 OFF/OP CNSLTJ NEW/EST LOW 30: CPT | Performed by: PHYSICAL MEDICINE & REHABILITATION

## 2023-12-13 PROCEDURE — 95910 NRV CNDJ TEST 7-8 STUDIES: CPT | Performed by: PHYSICAL MEDICINE & REHABILITATION

## 2023-12-13 PROCEDURE — G8428 CUR MEDS NOT DOCUMENT: HCPCS | Performed by: PHYSICAL MEDICINE & REHABILITATION

## 2023-12-13 PROCEDURE — 95886 MUSC TEST DONE W/N TEST COMP: CPT | Performed by: PHYSICAL MEDICINE & REHABILITATION

## 2023-12-13 NOTE — PROGRESS NOTES
Electrodiagnostic Laboratory  *Accredited by the [facility] with exemplary status  [address], [address] [place] [address]  Phone: [phone]  Fax: [phone]      Date of Examination: 12/13/23    Patient Name: Rain Caldwell    An independent historian was not needed. Rain Caldwell  is a 61y.o. year old female who was seen today regarding   Chief Complaint   Patient presents with    Extremity Pain     Pain in the shoulder and down the right arm. Cervical fusion C5-6    Numbness     Numbness/tingling about 3-4 years ago. Entire right arm and hand, constant feeling. Extremity Weakness     Decrease strength in both arms. .     The symptoms started after no known injury. The symptoms are constant. I have reviewed the referring provider's office note. There is not a family history of neuromuscular conditions. Physical Exam: General: The patient is in no apparent distress. MSK: There is no joint effusion, deformity, instability, swelling, erythema or warmth. AROM is full in the spine and extremities. Spurling is negative. Tinel is positive bilaterally. Neurologic:  No focal sensorimotor deficit. Reflexes 2+ and symmetric. Gait is normal.    Impression:     1. Right carpal tunnel syndrome        Plan:   EMG is indicated to evaluate the above diagnosis. Orders Placed This Encounter   Procedures    VT NERVE CONDUCTION STUDIES 7-8 STUDIES    VT NEEDLE EMG EA EXTREMTY W/PARASPINL AREA COMPLETE     EMG was done today and showed right median mononeuropathy at the wrist clinically consistent with carpal tunnel syndrome. Recommend neutral wrist splints at h.s., OT and/or carpal tunnel injection and if no improvement after 4-6 weeks of conservative treatments consider orthopedic surgery evaluation. No electrodiagnostic evidence of bilateral C5 through T1 radiculopathy. Correlate with cervical imaging for stenosis.    The patient was educated about the diagnosis and the
A.Elbow ADM 7.71 9.0 A. Elbow - B. Elbow 10 69 32.4       F  Wave      Nerve Fmin % F    ms %   R Median - APB 25.21 80   R Ulnar - ADM 25.21 50   L Median - APB 25.05 10       EMG      EMG Summary Table     Spontaneous MUAP Recruitment   Muscle Nerve Roots IA Fib PSW Fasc Amp Dur. PPP Pattern   R. Biceps brachii Musculocutaneous C5-C6 N None None None N N N N   R. Triceps brachii Radial C6-C8 N None None None N N N N   R. Pronator teres Median C6-C7 N None None None N N N N   R. First dorsal interosseous Ulnar C8-T1 N None None None N N N N   R. Abductor pollicis brevis Median H8-J2 N None None None N N N N   R. Cervical paraspinals (low)  - N None None None N N N N   R. Cervical paraspinals (mid)  - N None None None N N N N   L. Cervical paraspinals (low)  - N None None None N N N N   L. Cervical paraspinals (mid)  - N None None None N N N N   L. Biceps brachii Musculocutaneous C5-C6 N None None None N N N N   L. Triceps brachii Radial C6-C8 N None None None N N N N   L. Pronator teres Median C6-C7 N None None None N N N N   L. First dorsal interosseous Ulnar C8-T1 N None None None N N N N   L. Abductor pollicis brevis Median S4-F5 N None None None N N N N        Study Limitations:  obesity    Summary of Findings:   Nerve conduction studies: The following nerve conduction studies were abnormal:   Right median sensory latency at the wrist is prolonged. All other nerve conduction studies, as listed in the table were normal in latency, amplitude and conduction velocity. Needle EMG:   Needle EMG was performed using a concentric needle. Observed motor units were normal in amplitude, duration, phases and recruitment and no active denervation signs were seen. Diagnostic Interpretation: This study was abnormal.     Electrodiagnosis: There is electrodiagnostic evidence of a median mononeuropathy.    Location: right at the wrist.   Ngoc Lotus: [  ] Axonal   Caecilia.Muse  ] Demyelinating  [  ] Mixed axonal and

## 2023-12-13 NOTE — PATIENT INSTRUCTIONS
Electrodiagnotic Laboratory  Accredited by the [facility] with Exemplary status  PATTIE Felder D.O. [facility]  [address]. [facility] [address], [address] [place] [address]  Phone: [phone]  Fax: [phone]        Today you had an electrodiagnostic exam which included nerve conduction studies (NCS) and electromyography (EMG). This test evaluated the electrical activity of your nerves and muscles to help determine if you have a nerve or muscle disease. This test can help determine the location and type of a nerve or muscle problem. This will help your referring doctor diagnose your condition and determine the appropriate next step in your treatment plan. After your test:    1. There are no long lasting side effects of the test.     2. You may resume your normal activities without restrictions. 3.  Resume any medications that were stopped for the test.     4  If you have sore areas or bruising in your muscles where the needle was placed, apply a cold pack to the sore area for 15-20 minutes three to four times a day as needed for pain. The soreness should go away in about 1-2 days. 5. Your results were provided  Briefly at the end of your test and the final detailed report will be provided to your referring physician, and/or primary care physician and any other parties you requested within 1-2 days of the examination. You may wish to contact your referring provider after a few days to determine what they would like you to do next. 6.  Please call [phone] with any questions or concerns and if you develop increased body temperature/fever, swelling, tenderness, increased pain and/or drainage from the sites where the needle was placed. Thank you for choosing us for your health care needs.

## 2024-05-23 ENCOUNTER — HOSPITAL ENCOUNTER (OUTPATIENT)
Dept: GENERAL RADIOLOGY | Age: 64
Discharge: HOME | End: 2024-05-25
Payer: COMMERCIAL

## 2024-05-23 ENCOUNTER — HOSPITAL ENCOUNTER (OUTPATIENT)
Age: 64
Discharge: HOME | End: 2024-05-25
Payer: COMMERCIAL

## 2024-05-23 DIAGNOSIS — M25.562 LEFT KNEE PAIN, UNSPECIFIED CHRONICITY: ICD-10-CM

## 2024-05-23 DIAGNOSIS — M54.10 RADICULOPATHY, UNSPECIFIED SPINAL REGION: ICD-10-CM

## 2024-05-23 PROCEDURE — 72120 X-RAY BEND ONLY L-S SPINE: CPT

## 2024-05-23 PROCEDURE — 73560 X-RAY EXAM OF KNEE 1 OR 2: CPT

## 2024-05-25 ENCOUNTER — TRANSCRIBE ORDERS (OUTPATIENT)
Dept: ADMINISTRATIVE | Age: 64
End: 2024-05-25

## 2024-05-25 DIAGNOSIS — M54.16 LUMBAR RADICULOPATHY: ICD-10-CM

## 2024-05-25 DIAGNOSIS — M25.562 LEFT KNEE PAIN, UNSPECIFIED CHRONICITY: ICD-10-CM

## 2024-05-25 DIAGNOSIS — Z96.652 TOTAL KNEE REPLACEMENT STATUS, LEFT: Primary | ICD-10-CM

## 2024-05-25 DIAGNOSIS — M51.36 DDD (DEGENERATIVE DISC DISEASE), LUMBAR: ICD-10-CM

## 2025-06-24 ENCOUNTER — APPOINTMENT (OUTPATIENT)
Dept: NEUROSURGERY | Facility: HOSPITAL | Age: 65
End: 2025-06-24
Payer: MEDICARE

## 2025-07-07 ENCOUNTER — HOSPITAL ENCOUNTER (OUTPATIENT)
Dept: RADIOLOGY | Facility: EXTERNAL LOCATION | Age: 65
Discharge: HOME | End: 2025-07-07
Payer: MEDICARE

## 2025-07-08 ENCOUNTER — HOSPITAL ENCOUNTER (OUTPATIENT)
Dept: RADIOLOGY | Facility: EXTERNAL LOCATION | Age: 65
Discharge: HOME | End: 2025-07-08

## 2025-07-09 ENCOUNTER — OFFICE VISIT (OUTPATIENT)
Dept: NEUROSURGERY | Facility: HOSPITAL | Age: 65
End: 2025-07-09
Payer: MEDICARE

## 2025-07-09 VITALS
HEART RATE: 94 BPM | BODY MASS INDEX: 39.64 KG/M2 | OXYGEN SATURATION: 100 % | SYSTOLIC BLOOD PRESSURE: 151 MMHG | RESPIRATION RATE: 18 BRPM | TEMPERATURE: 97.2 F | DIASTOLIC BLOOD PRESSURE: 76 MMHG | WEIGHT: 216.5 LBS

## 2025-07-09 DIAGNOSIS — M54.81 BILATERAL OCCIPITAL NEURALGIA: Primary | ICD-10-CM

## 2025-07-09 PROCEDURE — 99205 OFFICE O/P NEW HI 60 MIN: CPT | Performed by: NEUROLOGICAL SURGERY

## 2025-07-09 PROCEDURE — 1126F AMNT PAIN NOTED NONE PRSNT: CPT | Performed by: NEUROLOGICAL SURGERY

## 2025-07-09 PROCEDURE — 64405 NJX AA&/STRD GR OCPL NRV: CPT | Performed by: NEUROLOGICAL SURGERY

## 2025-07-09 PROCEDURE — 99215 OFFICE O/P EST HI 40 MIN: CPT | Mod: 25 | Performed by: NEUROLOGICAL SURGERY

## 2025-07-09 ASSESSMENT — PAIN SCALES - GENERAL: PAINLEVEL_OUTOF10: 0-NO PAIN

## 2025-07-09 NOTE — PROGRESS NOTES
[place]   Neurosurgery    Diagnosis  Diagnoses and all orders for this visit:  Bilateral occipital neuralgia      Patient Discussion/Summary  Daxa has bilateral occipital neuralgia.  She previously underwent implantation of a cervical retrograde spinal cord stimulator paddle.  Although this has been beneficial, she has had many issues with the hardware, and it is currently unusable due to fractured contacts.    Given that her pain is purely occipital in nature, she is actually a better candidate for a trial of occipital nerve stimulation.  This has the benefit of being dedicated hardware, which is less prone to migration or fracture.  It is also MRI compatible.    We discussed an occipital nerve stimulator trial in detail.  This will involve the temporary placement of 2 Curonix electrodes targeting the bilateral occipital regions.  She will need to reach a pain improvement threshold of 50% or greater before proceeding with permanent implantation.    Should she have a successful trial, then we will plan on explanting her current hardware and implanting the permanent device in the same setting.    Prior to surgery, she will require neuropsychology testing.    The procedure was discussed in detail and all of their questions were answered.  We also discussed the risks of the procedure including, but not limited to the risks of bleeding, infection, hardware malfunction, and neurological injury including, but not limited to increased pain, numbness, weakness, paralysis or stroke.  The risks of an anesthetic including cardiorespiratory compromise, coma or death were touched upon, and will be discussed in greater detail by the anesthesiologist.      Provider Impressions  Daxa has bilateral occipital neuralgia, likely with a component of occipital headaches as well.  She previously underwent implantation of a retrograde cervical paddle electrode in 2018, which helped her pain remarkably.  She has had  subsequent issues with the stimulator, requiring repositioning of the battery due to difficulty charging.  The battery also required replacement as it short circuited during a cervical surgery.    Recently, it was found that only 5 of the contacts were working.  On interrogation today, there are now only 2 usable contacts.  This is almost certainly because of its placement, as retrograde positioning increases tension on the wires and makes the contacts prone to developing high impedances.    On further history, she actually has no neck pain or cervical radiculopathy whatsoever.  As such, this was implanted purely for the treatment of occipital neuralgia.    She does recall a previous occipital nerve ablation which was quite painful.  We performed an occipital nerve block on the right today, which alleviated her pain remarkably.  As such, she is a better candidate for trialing of an occipital nerve stimulator.    History of Present Illness  Chief Complaint: No chief complaint on file.        HPI: Daxa Garcia is a 65 y.o. female with hx of 64 year old, right handed female with a history of hypothyroidism, rheumatoid arthritis, depression, osteoporosis, chronic migraine, occipital neuralgia, chronic pain, and cervical radiculitis. Previous history of C6-C7 anterior cervical discectomy and fusion October 2024 (had prior C5-C6 ACDF) s/p spinal cord stimulator in her cervical spine for occipital neuralgia.     Patient has an Abbott Paddle cervical SCS for neck pain (implanted in 2018 by Dr. Alba at [facility]) which she reports is helpful, however, it was found to have some impedances, only 5 contacts working. Battery replaced Jan 2025 at [facility], after having surgery in October 2024 that caused the battery to stop working. The stimulator is somewhat working, she notes she get electrical impulses in her left arm, when her neck is in a particular position. Patient presents today to discuss replacement of leads.     Of note,  she also has low back pain s/p right-sided sacroiliac (SI) joint ablation performed by Dr. Guzman about 4 weeks ago, currently with post procedure numbness in the lower right buttock area and occasional sharp sensations.      ROS: As noted in HPI.      Previous History  Medical History[1]  Surgical History[2]  Social History[3]  Family History[4]  Allergies[5]  No current outpatient medications      Vitals  /76 (BP Location: Left arm, Patient Position: Sitting, BP Cuff Size: Adult)   Pulse 94   Temp 36.2 °C (97.2 °F) (Temporal)   Resp 18   Wt 98.2 kg (216 lb 8 oz)   SpO2 100%   BMI 39.64 kg/m²       Physical Exam  Constitutional: Well appearing. No acute distress.   Musculoskeletal: No visible deformity of joints and nails. Normal ROM.  Orientation: Oriented to self, place, and time  Language: Fluid speech and intact cognition  CN 2: Normal   CN 3, 4, and 6: Normal   CN 5: Normal   CN 7: Normal   CN 8: Normal   CN 9 and 10: Normal   CN 11: Normal   CN 12: Normal   Muscle strength: 5/5 strength both UE and LE.  Muscle tone: No atrophy, abnormal movements, flaccidity, cogwheeling or spasticity.   Gait and station: Non-antalgic and not broad-based. No shuffling steps.  Sensation: Grossly intact throughout all dermatomes. No allodynia. Tender to touch bilateral occipital nerves.  Reflexes: Symmetric and normal deep tendon reflexes throughout. Negative Garza's sign. No clonus at ankles.  Coordination: No dysmetria.  Mood and Affect: Appropriate mood and affect.       Results  Images of the cervical spine were reviewed, demonstrating a retrograde cervical paddle inserted at the occiput.  This is anchored at the C1 posterior arch.    Procedure  The posterior scalp was palpated, and tenderness was noted along the distribution of the lesser and greater occipital nerves, primarily the lesser nerves.  The right posterolateral scalp was cleaned in the usual sterile fashion.  4 mL of lidocaine (1%) was injected  under sterile precautions targeting the lesser occipital nerve, and then redirected medially to target the greater occipital nerve as well.  This resulted in immediate exacerbation of her pain, followed by the development of scalp numbness and pain alleviation.         [1]   Past Medical History:  Diagnosis Date    Cervical disc disorder    [2]   Past Surgical History:  Procedure Laterality Date    ANTERIOR CERVICAL DISCECTOMY W/ FUSION      CERVICAL FUSION      REPLACEMENT DISC ANTERIOR LUMBAR SPINE     [3]   Social History  Tobacco Use    Smoking status: Never    Smokeless tobacco: Never   Substance Use Topics    Alcohol use: Not Currently    Drug use: Never   [4] No family history on file.  [5] Not on File

## 2025-07-30 ENCOUNTER — TELEPHONE (OUTPATIENT)
Dept: ADMINISTRATIVE | Age: 65
End: 2025-07-30

## 2025-07-30 DIAGNOSIS — M47.816 LUMBAR SPONDYLOSIS: Primary | ICD-10-CM

## 2025-07-30 NOTE — TELEPHONE ENCOUNTER
Patient calling on her referral from Dr. Kelly, stated she was told she could just be set up for injection. Please advise if patient needs consult first.

## 2025-08-13 ENCOUNTER — PREP FOR PROCEDURE (OUTPATIENT)
Dept: NEUROSURGERY | Facility: HOSPITAL | Age: 65
End: 2025-08-13
Payer: MEDICARE

## 2025-08-13 ENCOUNTER — HOSPITAL ENCOUNTER (OUTPATIENT)
Facility: HOSPITAL | Age: 65
Setting detail: OUTPATIENT SURGERY
End: 2025-08-13
Attending: NEUROLOGICAL SURGERY | Admitting: NEUROLOGICAL SURGERY
Payer: MEDICARE

## 2025-08-13 ENCOUNTER — TELEPHONE (OUTPATIENT)
Dept: PHYSICAL MEDICINE AND REHAB | Age: 65
End: 2025-08-13

## 2025-08-13 DIAGNOSIS — M54.81 BILATERAL OCCIPITAL NEURALGIA: Primary | ICD-10-CM

## 2025-08-13 RX ORDER — CEFAZOLIN SODIUM 2 G/100ML
2 INJECTION, SOLUTION INTRAVENOUS ONCE
OUTPATIENT
Start: 2025-08-13 | End: 2025-08-13

## 2025-08-19 PROBLEM — M47.816 LUMBAR SPONDYLOSIS: Status: ACTIVE | Noted: 2025-08-19

## 2025-08-20 ENCOUNTER — TELEPHONE (OUTPATIENT)
Dept: PHYSICAL MEDICINE AND REHAB | Age: 65
End: 2025-08-20

## 2025-08-21 ENCOUNTER — APPOINTMENT (OUTPATIENT)
Dept: PREADMISSION TESTING | Facility: HOSPITAL | Age: 65
End: 2025-08-21
Payer: MEDICARE

## 2025-08-21 ENCOUNTER — TELEPHONE (OUTPATIENT)
Dept: NEUROSURGERY | Facility: HOSPITAL | Age: 65
End: 2025-08-21
Payer: MEDICARE

## 2025-08-25 RX ORDER — HYDROCODONE BITARTRATE AND ACETAMINOPHEN 5; 325 MG/1; MG/1
1 TABLET ORAL EVERY 6 HOURS PRN
COMMUNITY
Start: 2025-06-02

## 2025-08-26 ENCOUNTER — APPOINTMENT (OUTPATIENT)
Dept: PREADMISSION TESTING | Facility: HOSPITAL | Age: 65
End: 2025-08-26
Payer: MEDICARE

## 2025-08-28 ENCOUNTER — HOSPITAL ENCOUNTER (OUTPATIENT)
Dept: OPERATING ROOM | Age: 65
Setting detail: OUTPATIENT SURGERY
Discharge: HOME | End: 2025-08-28
Attending: PHYSICAL MEDICINE & REHABILITATION
Payer: COMMERCIAL

## 2025-08-28 ENCOUNTER — PREP FOR PROCEDURE (OUTPATIENT)
Dept: NEUROSURGERY | Facility: HOSPITAL | Age: 65
End: 2025-08-28
Payer: MEDICARE

## 2025-08-28 ENCOUNTER — HOSPITAL ENCOUNTER (OUTPATIENT)
Age: 65
Setting detail: OUTPATIENT SURGERY
Discharge: HOME | End: 2025-08-28
Attending: PHYSICAL MEDICINE & REHABILITATION | Admitting: PHYSICAL MEDICINE & REHABILITATION
Payer: COMMERCIAL

## 2025-08-28 VITALS
WEIGHT: 225 LBS | DIASTOLIC BLOOD PRESSURE: 55 MMHG | HEIGHT: 63 IN | TEMPERATURE: 97.8 F | SYSTOLIC BLOOD PRESSURE: 134 MMHG | HEART RATE: 65 BPM | RESPIRATION RATE: 16 BRPM | BODY MASS INDEX: 39.87 KG/M2 | OXYGEN SATURATION: 98 %

## 2025-08-28 DIAGNOSIS — T85.192S MALFUNCTION OF SPINAL CORD STIMULATOR, SEQUELA: Primary | ICD-10-CM

## 2025-08-28 DIAGNOSIS — M54.81 BILATERAL OCCIPITAL NEURALGIA: ICD-10-CM

## 2025-08-28 DIAGNOSIS — M47.896 OTHER OSTEOARTHRITIS OF SPINE, LUMBAR REGION: ICD-10-CM

## 2025-08-28 PROBLEM — T85.192A MALFUNCTION OF SPINAL CORD STIMULATOR: Status: ACTIVE | Noted: 2025-08-28

## 2025-08-28 PROCEDURE — 7100000010 HC PHASE II RECOVERY - FIRST 15 MIN: Performed by: PHYSICAL MEDICINE & REHABILITATION

## 2025-08-28 PROCEDURE — 7100000011 HC PHASE II RECOVERY - ADDTL 15 MIN: Performed by: PHYSICAL MEDICINE & REHABILITATION

## 2025-08-28 PROCEDURE — 2709999900 HC NON-CHARGEABLE SUPPLY: Performed by: PHYSICAL MEDICINE & REHABILITATION

## 2025-08-28 PROCEDURE — 3600000005 HC SURGERY LEVEL 5 BASE: Performed by: PHYSICAL MEDICINE & REHABILITATION

## 2025-08-28 PROCEDURE — 6360000002 HC RX W HCPCS: Performed by: PHYSICAL MEDICINE & REHABILITATION

## 2025-08-28 RX ORDER — SODIUM CHLORIDE 0.9 % (FLUSH) 0.9 %
5-40 SYRINGE (ML) INJECTION PRN
Status: DISCONTINUED | OUTPATIENT
Start: 2025-08-28 | End: 2025-08-28 | Stop reason: HOSPADM

## 2025-08-28 RX ORDER — CEFAZOLIN SODIUM 2 G/100ML
2 INJECTION, SOLUTION INTRAVENOUS ONCE
OUTPATIENT
Start: 2025-08-28 | End: 2025-08-28

## 2025-08-28 RX ORDER — LIDOCAINE HYDROCHLORIDE 10 MG/ML
INJECTION, SOLUTION EPIDURAL; INFILTRATION; INTRACAUDAL; PERINEURAL PRN
Status: DISCONTINUED | OUTPATIENT
Start: 2025-08-28 | End: 2025-08-28 | Stop reason: ALTCHOICE

## 2025-08-28 RX ORDER — SODIUM CHLORIDE 9 MG/ML
INJECTION, SOLUTION INTRAVENOUS PRN
Status: DISCONTINUED | OUTPATIENT
Start: 2025-08-28 | End: 2025-08-28 | Stop reason: HOSPADM

## 2025-08-28 RX ORDER — SODIUM CHLORIDE 0.9 % (FLUSH) 0.9 %
5-40 SYRINGE (ML) INJECTION EVERY 12 HOURS SCHEDULED
Status: DISCONTINUED | OUTPATIENT
Start: 2025-08-28 | End: 2025-08-28 | Stop reason: HOSPADM

## 2025-08-28 ASSESSMENT — PAIN - FUNCTIONAL ASSESSMENT
PAIN_FUNCTIONAL_ASSESSMENT: 0-10
PAIN_FUNCTIONAL_ASSESSMENT: 0-10

## 2025-09-03 ENCOUNTER — APPOINTMENT (OUTPATIENT)
Dept: NEUROSURGERY | Facility: HOSPITAL | Age: 65
End: 2025-09-03
Payer: MEDICARE

## (undated) DEVICE — MARKER SURG SKIN GENTIAN VLT REG TIP W/ 6IN RUL AND BLNK DYNJSM02

## (undated) DEVICE — BANDAGE ADH W1XL3IN NAT FAB WVN FLX DURABLE N ADH PD SEAL

## (undated) DEVICE — NEEDLE HYPO 25GA L1.5IN BLU POLYPR HUB S STL REG BVL STR

## (undated) DEVICE — SYRINGE MED 3ML HYPO NDL BOLD GRAD 0.1ML STD LUERLOCK TIP

## (undated) DEVICE — SPONGE GZ W4XL4IN 100% COT WVN 12 PLY 2 PER PK NON21424

## (undated) DEVICE — NEEDLE SPNL 22GA L3.5IN BLK HUB S STL REG WALL FIT STYL

## (undated) DEVICE — NEEDLE HYPO 18GA L1.5IN PNK POLYPR HUB S STL REG BVL STR

## (undated) DEVICE — SET EXTN PRIMING 0.26ML L6.8IN MICBOR STR TYP CATH M

## (undated) DEVICE — APPLICATOR MEDICATED 26 CC SOLUTION HI LT ORNG CHLORAPREP

## (undated) DEVICE — GLOVE SURG SZ 65 L116IN LTX POLYMER BEAD CUF TEXT FINISH HI

## (undated) DEVICE — ELECTRODE EKG AD W1.6XL1.36IN FOAM TAPE DIAPHORETIC FLD

## (undated) DEVICE — SYRINGE FLSH 6ML BOLD GRAD 0.2ML LUERLOCK TIP ULT SHRP TRI

## (undated) DEVICE — TOWEL SURG W17XL27IN BLU COT STD PREWASHED STERILE 6 PER PK